# Patient Record
Sex: FEMALE | Race: WHITE | NOT HISPANIC OR LATINO | URBAN - METROPOLITAN AREA
[De-identification: names, ages, dates, MRNs, and addresses within clinical notes are randomized per-mention and may not be internally consistent; named-entity substitution may affect disease eponyms.]

---

## 2017-05-15 ENCOUNTER — OUTPATIENT (OUTPATIENT)
Dept: OUTPATIENT SERVICES | Facility: HOSPITAL | Age: 57
LOS: 1 days | Discharge: HOME | End: 2017-05-15

## 2017-06-28 DIAGNOSIS — Z12.31 ENCOUNTER FOR SCREENING MAMMOGRAM FOR MALIGNANT NEOPLASM OF BREAST: ICD-10-CM

## 2018-01-07 ENCOUNTER — TRANSCRIPTION ENCOUNTER (OUTPATIENT)
Age: 58
End: 2018-01-07

## 2018-03-16 ENCOUNTER — TRANSCRIPTION ENCOUNTER (OUTPATIENT)
Age: 58
End: 2018-03-16

## 2019-03-16 ENCOUNTER — OUTPATIENT (OUTPATIENT)
Dept: OUTPATIENT SERVICES | Facility: HOSPITAL | Age: 59
LOS: 1 days | Discharge: HOME | End: 2019-03-16

## 2019-03-16 DIAGNOSIS — M25.561 PAIN IN RIGHT KNEE: ICD-10-CM

## 2019-03-16 DIAGNOSIS — M25.562 PAIN IN LEFT KNEE: ICD-10-CM

## 2019-04-29 ENCOUNTER — OUTPATIENT (OUTPATIENT)
Dept: OUTPATIENT SERVICES | Facility: HOSPITAL | Age: 59
LOS: 1 days | Discharge: HOME | End: 2019-04-29
Payer: COMMERCIAL

## 2019-04-29 DIAGNOSIS — Z12.31 ENCOUNTER FOR SCREENING MAMMOGRAM FOR MALIGNANT NEOPLASM OF BREAST: ICD-10-CM

## 2019-04-29 PROCEDURE — 77063 BREAST TOMOSYNTHESIS BI: CPT | Mod: 26

## 2019-04-29 PROCEDURE — 77067 SCR MAMMO BI INCL CAD: CPT | Mod: 26

## 2019-12-24 ENCOUNTER — TRANSCRIPTION ENCOUNTER (OUTPATIENT)
Age: 59
End: 2019-12-24

## 2020-10-12 ENCOUNTER — OUTPATIENT (OUTPATIENT)
Dept: OUTPATIENT SERVICES | Facility: HOSPITAL | Age: 60
LOS: 1 days | Discharge: HOME | End: 2020-10-12
Payer: COMMERCIAL

## 2020-10-12 DIAGNOSIS — Z12.31 ENCOUNTER FOR SCREENING MAMMOGRAM FOR MALIGNANT NEOPLASM OF BREAST: ICD-10-CM

## 2020-10-12 PROCEDURE — 77067 SCR MAMMO BI INCL CAD: CPT | Mod: 26

## 2020-10-12 PROCEDURE — 77063 BREAST TOMOSYNTHESIS BI: CPT | Mod: 26

## 2020-11-03 ENCOUNTER — OUTPATIENT (OUTPATIENT)
Dept: OUTPATIENT SERVICES | Facility: HOSPITAL | Age: 60
LOS: 1 days | Discharge: HOME | End: 2020-11-03
Payer: COMMERCIAL

## 2020-11-03 DIAGNOSIS — R92.2 INCONCLUSIVE MAMMOGRAM: ICD-10-CM

## 2020-11-03 PROCEDURE — 76641 ULTRASOUND BREAST COMPLETE: CPT | Mod: 26,50

## 2021-04-12 DIAGNOSIS — Z87.891 PERSONAL HISTORY OF NICOTINE DEPENDENCE: ICD-10-CM

## 2021-04-12 DIAGNOSIS — Z63.5 DISRUPTION OF FAMILY BY SEPARATION AND DIVORCE: ICD-10-CM

## 2021-04-12 DIAGNOSIS — N87.0 MILD CERVICAL DYSPLASIA: ICD-10-CM

## 2021-04-12 DIAGNOSIS — R87.612 LOW GRADE SQUAMOUS INTRAEPITHELIAL LESION ON CYTOLOGIC SMEAR OF CERVIX (LGSIL): ICD-10-CM

## 2021-04-12 DIAGNOSIS — R92.2 INCONCLUSIVE MAMMOGRAM: ICD-10-CM

## 2021-04-12 DIAGNOSIS — Z78.9 OTHER SPECIFIED HEALTH STATUS: ICD-10-CM

## 2021-04-12 PROBLEM — Z00.00 ENCOUNTER FOR PREVENTIVE HEALTH EXAMINATION: Status: ACTIVE | Noted: 2021-04-12

## 2021-04-12 LAB — CYTOLOGY CVX/VAG DOC THIN PREP: NEGATIVE

## 2021-04-12 SDOH — SOCIAL STABILITY - SOCIAL INSECURITY: DISRUPTION OF FAMILY BY SEPARATION AND DIVORCE: Z63.5

## 2021-05-03 ENCOUNTER — APPOINTMENT (OUTPATIENT)
Dept: OBGYN | Facility: CLINIC | Age: 61
End: 2021-05-03
Payer: COMMERCIAL

## 2021-05-03 VITALS
SYSTOLIC BLOOD PRESSURE: 124 MMHG | BODY MASS INDEX: 30.83 KG/M2 | HEIGHT: 63 IN | DIASTOLIC BLOOD PRESSURE: 80 MMHG | WEIGHT: 174 LBS

## 2021-05-03 PROCEDURE — 99213 OFFICE O/P EST LOW 20 MIN: CPT

## 2021-05-03 PROCEDURE — 99072 ADDL SUPL MATRL&STAF TM PHE: CPT

## 2021-05-03 NOTE — HISTORY OF PRESENT ILLNESS
[Patient reported PAP Smear was abnormal] : Patient reported PAP Smear was abnormal [PapSmeardate] : 11/2020 [TextBox_31] : (+) HPV [HPVDate] : 11/2020 [TextBox_78] : (+) [Approximately ___ (Month)] : LMP was approximately [unfilled] month(s) ago [Currently In Menopause] : currently in menopause [FreeTextEntry1] : 2012

## 2021-05-07 LAB — HPV HIGH+LOW RISK DNA PNL CVX: NOT DETECTED

## 2021-05-18 LAB — CYTOLOGY CVX/VAG DOC THIN PREP: NORMAL

## 2021-06-09 ENCOUNTER — OUTPATIENT (OUTPATIENT)
Dept: OUTPATIENT SERVICES | Facility: HOSPITAL | Age: 61
LOS: 1 days | Discharge: HOME | End: 2021-06-09
Payer: COMMERCIAL

## 2021-06-09 DIAGNOSIS — M54.5 LOW BACK PAIN: ICD-10-CM

## 2021-06-09 PROCEDURE — 71100 X-RAY EXAM RIBS UNI 2 VIEWS: CPT | Mod: 26,LT

## 2021-06-09 PROCEDURE — 72072 X-RAY EXAM THORAC SPINE 3VWS: CPT | Mod: 26

## 2021-06-09 PROCEDURE — 71046 X-RAY EXAM CHEST 2 VIEWS: CPT | Mod: 26

## 2021-08-01 ENCOUNTER — TRANSCRIPTION ENCOUNTER (OUTPATIENT)
Age: 61
End: 2021-08-01

## 2021-11-18 ENCOUNTER — TRANSCRIPTION ENCOUNTER (OUTPATIENT)
Age: 61
End: 2021-11-18

## 2021-11-29 ENCOUNTER — LABORATORY RESULT (OUTPATIENT)
Age: 61
End: 2021-11-29

## 2021-11-29 ENCOUNTER — APPOINTMENT (OUTPATIENT)
Dept: OBGYN | Facility: CLINIC | Age: 61
End: 2021-11-29
Payer: COMMERCIAL

## 2021-11-29 VITALS
SYSTOLIC BLOOD PRESSURE: 122 MMHG | HEIGHT: 63 IN | BODY MASS INDEX: 30.12 KG/M2 | WEIGHT: 170 LBS | DIASTOLIC BLOOD PRESSURE: 79 MMHG

## 2021-11-29 DIAGNOSIS — Z01.419 ENCOUNTER FOR GYNECOLOGICAL EXAMINATION (GENERAL) (ROUTINE) W/OUT ABNORMAL FINDINGS: ICD-10-CM

## 2021-11-29 PROCEDURE — 76830 TRANSVAGINAL US NON-OB: CPT

## 2021-11-29 PROCEDURE — 99396 PREV VISIT EST AGE 40-64: CPT | Mod: 25

## 2021-11-29 NOTE — PHYSICAL EXAM
[Chaperone Present] : A chaperone was present in the examining room during all aspects of the physical examination [Examination Of The Breasts] : a normal appearance [] : implants [No Masses] : no breast masses were palpable [Labia Majora] : normal [Labia Minora] : normal [Normal] : normal [Enlarged ___ wks] : enlarged [unfilled] ~Uweeks [Anteversion] : anteverted [Uterine Adnexae] : normal

## 2021-11-29 NOTE — HISTORY OF PRESENT ILLNESS
[postmenopausal] : postmenopausal [N] : Patient does not use contraception [Monogamous (Male Partner)] : is monogamous with a male partner [Y] : Positive pregnancy history [Currently In Menopause] : currently in menopause [Menopause Age: ____] : age at menopause was [unfilled] [Currently Active] : currently active [Men] : men [No] : No [FreeTextEntry1] : annual visit no complaints [LMPDate] : 2012 [PGHxTotal] : 2 [[facility] InstitutexLiving] : 2 [Post-Menopause, No Sxs] : post-menopausal, currently without symptoms

## 2021-11-29 NOTE — PROCEDURE
[Cervical Pap Smear] : cervical Pap smear [Liquid Base] : liquid base [GC & Chlamydia via Pap] : GC & Chlamydia via Pap [Tolerated Well] : the patient tolerated the procedure well [No Complications] : there were no complications [Transvaginal Ultrasound] : transvaginal ultrasound [Anteverted] : anteverted [FreeTextEntry5] : lower uterine segment fibroid 4.58cc vol  no increase in size [FreeTextEntry7] : 1.20ccvol   0.25cc follicule [FreeTextEntry8] : 0.76ccvol    0.18cc follicle

## 2021-12-02 ENCOUNTER — RESULT REVIEW (OUTPATIENT)
Age: 61
End: 2021-12-02

## 2021-12-02 ENCOUNTER — OUTPATIENT (OUTPATIENT)
Dept: OUTPATIENT SERVICES | Facility: HOSPITAL | Age: 61
LOS: 1 days | Discharge: HOME | End: 2021-12-02
Payer: COMMERCIAL

## 2021-12-02 DIAGNOSIS — Z12.31 ENCOUNTER FOR SCREENING MAMMOGRAM FOR MALIGNANT NEOPLASM OF BREAST: ICD-10-CM

## 2021-12-02 DIAGNOSIS — R92.2 INCONCLUSIVE MAMMOGRAM: ICD-10-CM

## 2021-12-02 PROCEDURE — 76641 ULTRASOUND BREAST COMPLETE: CPT | Mod: 26,50

## 2021-12-02 PROCEDURE — 77067 SCR MAMMO BI INCL CAD: CPT | Mod: 26

## 2021-12-02 PROCEDURE — 77063 BREAST TOMOSYNTHESIS BI: CPT | Mod: 26

## 2021-12-07 LAB
C TRACH RRNA SPEC QL NAA+PROBE: NOT DETECTED
N GONORRHOEA RRNA SPEC QL NAA+PROBE: NOT DETECTED
SOURCE AMPLIFICATION: NORMAL

## 2021-12-13 ENCOUNTER — NON-APPOINTMENT (OUTPATIENT)
Age: 61
End: 2021-12-13

## 2021-12-14 LAB
CYTOLOGY CVX/VAG DOC THIN PREP: NORMAL
HPV HIGH+LOW RISK DNA PNL CVX: DETECTED

## 2021-12-15 ENCOUNTER — TRANSCRIPTION ENCOUNTER (OUTPATIENT)
Age: 61
End: 2021-12-15

## 2022-05-23 ENCOUNTER — OUTPATIENT (OUTPATIENT)
Dept: OUTPATIENT SERVICES | Facility: HOSPITAL | Age: 62
LOS: 1 days | Discharge: HOME | End: 2022-05-23
Payer: COMMERCIAL

## 2022-05-23 VITALS
RESPIRATION RATE: 14 BRPM | DIASTOLIC BLOOD PRESSURE: 76 MMHG | HEART RATE: 77 BPM | TEMPERATURE: 99 F | HEIGHT: 63 IN | WEIGHT: 175.05 LBS | OXYGEN SATURATION: 99 % | SYSTOLIC BLOOD PRESSURE: 142 MMHG

## 2022-05-23 DIAGNOSIS — Z01.818 ENCOUNTER FOR OTHER PREPROCEDURAL EXAMINATION: ICD-10-CM

## 2022-05-23 DIAGNOSIS — S83.249D OTHER TEAR OF MEDIAL MENISCUS, CURRENT INJURY, UNSPECIFIED KNEE, SUBSEQUENT ENCOUNTER: ICD-10-CM

## 2022-05-23 DIAGNOSIS — Z98.82 BREAST IMPLANT STATUS: Chronic | ICD-10-CM

## 2022-05-23 DIAGNOSIS — Z98.890 OTHER SPECIFIED POSTPROCEDURAL STATES: Chronic | ICD-10-CM

## 2022-05-23 DIAGNOSIS — K63.1 PERFORATION OF INTESTINE (NONTRAUMATIC): Chronic | ICD-10-CM

## 2022-05-23 LAB
ALBUMIN SERPL ELPH-MCNC: 4.7 G/DL — SIGNIFICANT CHANGE UP (ref 3.5–5.2)
ALP SERPL-CCNC: 79 U/L — SIGNIFICANT CHANGE UP (ref 30–115)
ALT FLD-CCNC: 28 U/L — SIGNIFICANT CHANGE UP (ref 0–41)
ANION GAP SERPL CALC-SCNC: 12 MMOL/L — SIGNIFICANT CHANGE UP (ref 7–14)
APTT BLD: 31.3 SEC — SIGNIFICANT CHANGE UP (ref 27–39.2)
AST SERPL-CCNC: 24 U/L — SIGNIFICANT CHANGE UP (ref 0–41)
BASOPHILS # BLD AUTO: 0.07 K/UL — SIGNIFICANT CHANGE UP (ref 0–0.2)
BASOPHILS NFR BLD AUTO: 0.7 % — SIGNIFICANT CHANGE UP (ref 0–1)
BILIRUB SERPL-MCNC: 0.3 MG/DL — SIGNIFICANT CHANGE UP (ref 0.2–1.2)
BUN SERPL-MCNC: 26 MG/DL — HIGH (ref 10–20)
CALCIUM SERPL-MCNC: 9.7 MG/DL — SIGNIFICANT CHANGE UP (ref 8.5–10.1)
CHLORIDE SERPL-SCNC: 103 MMOL/L — SIGNIFICANT CHANGE UP (ref 98–110)
CO2 SERPL-SCNC: 26 MMOL/L — SIGNIFICANT CHANGE UP (ref 17–32)
CREAT SERPL-MCNC: 0.9 MG/DL — SIGNIFICANT CHANGE UP (ref 0.7–1.5)
EGFR: 72 ML/MIN/1.73M2 — SIGNIFICANT CHANGE UP
EOSINOPHIL # BLD AUTO: 0.09 K/UL — SIGNIFICANT CHANGE UP (ref 0–0.7)
EOSINOPHIL NFR BLD AUTO: 0.9 % — SIGNIFICANT CHANGE UP (ref 0–8)
GLUCOSE SERPL-MCNC: 81 MG/DL — SIGNIFICANT CHANGE UP (ref 70–99)
HCT VFR BLD CALC: 43 % — SIGNIFICANT CHANGE UP (ref 37–47)
HGB BLD-MCNC: 13.8 G/DL — SIGNIFICANT CHANGE UP (ref 12–16)
IMM GRANULOCYTES NFR BLD AUTO: 0.3 % — SIGNIFICANT CHANGE UP (ref 0.1–0.3)
INR BLD: 0.95 RATIO — SIGNIFICANT CHANGE UP (ref 0.65–1.3)
LYMPHOCYTES # BLD AUTO: 2.11 K/UL — SIGNIFICANT CHANGE UP (ref 1.2–3.4)
LYMPHOCYTES # BLD AUTO: 22.1 % — SIGNIFICANT CHANGE UP (ref 20.5–51.1)
MCHC RBC-ENTMCNC: 29.7 PG — SIGNIFICANT CHANGE UP (ref 27–31)
MCHC RBC-ENTMCNC: 32.1 G/DL — SIGNIFICANT CHANGE UP (ref 32–37)
MCV RBC AUTO: 92.5 FL — SIGNIFICANT CHANGE UP (ref 81–99)
MONOCYTES # BLD AUTO: 0.69 K/UL — HIGH (ref 0.1–0.6)
MONOCYTES NFR BLD AUTO: 7.2 % — SIGNIFICANT CHANGE UP (ref 1.7–9.3)
NEUTROPHILS # BLD AUTO: 6.57 K/UL — HIGH (ref 1.4–6.5)
NEUTROPHILS NFR BLD AUTO: 68.8 % — SIGNIFICANT CHANGE UP (ref 42.2–75.2)
NRBC # BLD: 0 /100 WBCS — SIGNIFICANT CHANGE UP (ref 0–0)
PLATELET # BLD AUTO: 313 K/UL — SIGNIFICANT CHANGE UP (ref 130–400)
POTASSIUM SERPL-MCNC: 3.9 MMOL/L — SIGNIFICANT CHANGE UP (ref 3.5–5)
POTASSIUM SERPL-SCNC: 3.9 MMOL/L — SIGNIFICANT CHANGE UP (ref 3.5–5)
PROT SERPL-MCNC: 7.8 G/DL — SIGNIFICANT CHANGE UP (ref 6–8)
PROTHROM AB SERPL-ACNC: 10.9 SEC — SIGNIFICANT CHANGE UP (ref 9.95–12.87)
RBC # BLD: 4.65 M/UL — SIGNIFICANT CHANGE UP (ref 4.2–5.4)
RBC # FLD: 12.8 % — SIGNIFICANT CHANGE UP (ref 11.5–14.5)
SODIUM SERPL-SCNC: 141 MMOL/L — SIGNIFICANT CHANGE UP (ref 135–146)
WBC # BLD: 9.56 K/UL — SIGNIFICANT CHANGE UP (ref 4.8–10.8)
WBC # FLD AUTO: 9.56 K/UL — SIGNIFICANT CHANGE UP (ref 4.8–10.8)

## 2022-05-23 PROCEDURE — 93010 ELECTROCARDIOGRAM REPORT: CPT

## 2022-05-23 NOTE — H&P PST ADULT - DOCUMENT STATUS
Normal rate, regular rhythm.  Heart sounds S1, S2.  No murmurs, rubs or gallops. Authored by Resident/PA/NP

## 2022-05-23 NOTE — H&P PST ADULT - HISTORY OF PRESENT ILLNESS
62y Female presents today for presurgical testing for  RIGHT KNEE ARTHROSCOPY. Patient reports progressively worsening right knee pain initially beginning 2019, and found to be a tear on MRI. She states that with PT, the pain was eliminated until this past 12/2021 after having moved boxes and done extensive walking. Since that time, she has had constant pain, localized to her right knee, 10/10 at times on pain scale, sharp, occasionally throbbing in nature, worsened with walking during which time she notes a cracking sound in the knee. She reports that elevation of the knee is the only thing that minimally alleviates the pain, and that previous cortisone injection was ineffective. She denies any injury and offers no other complaints at this time.   Patient denies any CP, palpitations, SOB, cough, or dysuria. No recent URI or UTI.  Stated exercise tolerance is FOS 2, limited due to knee pain  ZUHAIR screen reviewed    Patient denies any recent personal exposure to COVID19. Denies any sick contacts. Patient denies travel within the past 30 days. Patient was instructed to quarantine until after procedure.    Anesthesia Alert  NO--Difficult Airway  NO--History of neck surgery or radiation  NO--Limited ROM of neck  NO--History of Malignant hyperthermia  NO--Personal or family history of Pseudocholinesterase deficiency.  YES--Prior Anesthesia Complication - PONV  NO--Latex Allergy  NO--Loose teeth  NO--History of Rheumatoid Arthritis  NO--ZUHAIR  NO--Bleeding risk  NO--Other_____    Patient states that this is their complete medical history and list of medications.  Patient verbalized understanding of instructions given during this visit and was given the opportunity to ask questions and have them answered. They were instructed to follow up with their surgeon/surgeon's office with any questions regarding their procedure.

## 2022-05-23 NOTE — H&P PST ADULT - REASON FOR ADMISSION
Case Type: OP Block TimeSuite: CASProceduralist: Dylon Gamez  Confirmed Surgery DateTime: 06- - 0:00PAST DateTime: 05- - 15:45Procedure: RIGHT KNEE ARTHROSCOPY  ERP?: UnavailableLaterality: RightLength of Procedure: 60 Minutes  Anesthesia Type: General

## 2022-05-23 NOTE — H&P PST ADULT - NSICDXPASTSURGICALHX_GEN_ALL_CORE_FT
PAST SURGICAL HISTORY:  Bowel perforation traumatic, during surgical procedure    H/O bilateral breast implants silicone    H/O hernia repair with mesh, abdomen    History of carpal tunnel repair b/l    History of colostomy reversal

## 2022-05-23 NOTE — H&P PST ADULT - NSICDXFAMILYHX_GEN_ALL_CORE_FT
FAMILY HISTORY:  FH: HTN (hypertension)    Father  Still living? No  FH: heart attack, Age at diagnosis: Age Unknown

## 2022-06-10 NOTE — ASU PATIENT PROFILE, ADULT - FALL HARM RISK - UNIVERSAL INTERVENTIONS
Bed in lowest position, wheels locked, appropriate side rails in place/Call bell, personal items and telephone in reach/Instruct patient to call for assistance before getting out of bed or chair/Non-slip footwear when patient is out of bed/[place] to call system/Physically safe environment - no spills, clutter or unnecessary equipment/Purposeful Proactive Rounding/Room/bathroom lighting operational, light cord in reach

## 2022-06-13 ENCOUNTER — OUTPATIENT (OUTPATIENT)
Dept: OUTPATIENT SERVICES | Facility: HOSPITAL | Age: 62
LOS: 1 days | Discharge: HOME | End: 2022-06-13
Payer: COMMERCIAL

## 2022-06-13 ENCOUNTER — RESULT REVIEW (OUTPATIENT)
Age: 62
End: 2022-06-13

## 2022-06-13 ENCOUNTER — TRANSCRIPTION ENCOUNTER (OUTPATIENT)
Age: 62
End: 2022-06-13

## 2022-06-13 VITALS
TEMPERATURE: 99 F | DIASTOLIC BLOOD PRESSURE: 71 MMHG | HEIGHT: 63 IN | HEART RATE: 72 BPM | WEIGHT: 175.05 LBS | RESPIRATION RATE: 20 BRPM | SYSTOLIC BLOOD PRESSURE: 126 MMHG | OXYGEN SATURATION: 100 %

## 2022-06-13 VITALS
HEART RATE: 73 BPM | DIASTOLIC BLOOD PRESSURE: 79 MMHG | RESPIRATION RATE: 14 BRPM | OXYGEN SATURATION: 98 % | SYSTOLIC BLOOD PRESSURE: 125 MMHG

## 2022-06-13 DIAGNOSIS — K63.1 PERFORATION OF INTESTINE (NONTRAUMATIC): Chronic | ICD-10-CM

## 2022-06-13 DIAGNOSIS — S83.249A OTHER TEAR OF MEDIAL MENISCUS, CURRENT INJURY, UNSPECIFIED KNEE, INITIAL ENCOUNTER: ICD-10-CM

## 2022-06-13 DIAGNOSIS — Z98.890 OTHER SPECIFIED POSTPROCEDURAL STATES: Chronic | ICD-10-CM

## 2022-06-13 DIAGNOSIS — Z98.82 BREAST IMPLANT STATUS: Chronic | ICD-10-CM

## 2022-06-13 PROCEDURE — 88304 TISSUE EXAM BY PATHOLOGIST: CPT | Mod: 26

## 2022-06-13 RX ORDER — MORPHINE SULFATE 50 MG/1
4 CAPSULE, EXTENDED RELEASE ORAL
Refills: 0 | Status: DISCONTINUED | OUTPATIENT
Start: 2022-06-13 | End: 2022-06-13

## 2022-06-13 RX ORDER — MEPERIDINE HYDROCHLORIDE 50 MG/ML
12.5 INJECTION INTRAMUSCULAR; INTRAVENOUS; SUBCUTANEOUS ONCE
Refills: 0 | Status: DISCONTINUED | OUTPATIENT
Start: 2022-06-13 | End: 2022-06-13

## 2022-06-13 RX ORDER — OXYCODONE AND ACETAMINOPHEN 5; 325 MG/1; MG/1
1 TABLET ORAL ONCE
Refills: 0 | Status: DISCONTINUED | OUTPATIENT
Start: 2022-06-13 | End: 2022-06-13

## 2022-06-13 RX ORDER — SODIUM CHLORIDE 9 MG/ML
1000 INJECTION, SOLUTION INTRAVENOUS
Refills: 0 | Status: DISCONTINUED | OUTPATIENT
Start: 2022-06-13 | End: 2022-06-27

## 2022-06-13 RX ORDER — ONDANSETRON 8 MG/1
4 TABLET, FILM COATED ORAL ONCE
Refills: 0 | Status: DISCONTINUED | OUTPATIENT
Start: 2022-06-13 | End: 2022-06-27

## 2022-06-13 RX ORDER — MORPHINE SULFATE 50 MG/1
2 CAPSULE, EXTENDED RELEASE ORAL
Refills: 0 | Status: DISCONTINUED | OUTPATIENT
Start: 2022-06-13 | End: 2022-06-13

## 2022-06-13 RX ORDER — OXYCODONE AND ACETAMINOPHEN 5; 325 MG/1; MG/1
1 TABLET ORAL
Qty: 12 | Refills: 0
Start: 2022-06-13

## 2022-06-13 RX ADMIN — OXYCODONE AND ACETAMINOPHEN 1 TABLET(S): 5; 325 TABLET ORAL at 10:49

## 2022-06-13 RX ADMIN — OXYCODONE AND ACETAMINOPHEN 1 TABLET(S): 5; 325 TABLET ORAL at 11:15

## 2022-06-13 RX ADMIN — SODIUM CHLORIDE 100 MILLILITER(S): 9 INJECTION, SOLUTION INTRAVENOUS at 10:31

## 2022-06-13 NOTE — ASU DISCHARGE PLAN (ADULT/PEDIATRIC) - ASU DC SPECIAL INSTRUCTIONSFT
Post Operative Instructions for Knee Surgery    Your Surgery Included  [x ] Menisectomy  [ ] Meniscus Repair					  [ x] Synovectomy/Plica Excision	  [x ] Debridement/Chondroplasty  [ ] Lysis of Adhesions  [ ] ACL reconstruction			  [ ] PCL Reconstruction  [ ] Other:  	  Call our office ([phone]) immediately if you experience any of the following:  •	Excessive bleeding or pus like drainage at the incision site  •	Uncontrollable pain not relieved by pain medication  •	Excessive swelling or redness at the incision site  •	Fever above 101.5 degrees not controlled with Tylenol or Motrin  •	Shortness of Breath  •	Any foul odor or blistering from the surgery site    Pain Management: You were given one or more prescriptions before leaving the hospital. Have the prescriptions filled at a pharmacy on your way home and follow the instructions on the bottles.   Regional Anesthesia Injections (Blocks): You may have been given a regional nerve block either before or after surgery. This may numb your leg for 24-36 hours  	*Proceed with caution when weight bearing on your leg    Diet: Eat a bland diet for the first day after surgery. Progress your diet as tolerated. Constipation may occur with Narcotic usage, contact our office if you are experiencing constipation.    Activity: Limit your activity during the first 48 hours, keep your leg elevated with pillows under your heel. After the first 48 hours at home, increase your activity level based on your symptoms.    Dressing Change: Remove the dressing on the 3rd day. It is normal for some blood to be seen on the dressings. It is also normal for you to see apparent bruising on the skin around your knee when you remove the dressing. If present, leave the steri-strip tape across the incisions. If you are concerned by the drainage or the appearance of your knee, please call one of the numbers listed. Keep covered with Band-Aids/bandages.    Showering: You may shower on the 5th day after surgery if the wound is dry and clean, but do not let the wound soak in water until sutures are removed. Do not submerge in any water until after your postoperative appointment in clinic.    Weight Bearing:						  [ ] Weight bearing as tolerated		  [ ] Nonweight bearing				  [ ] Other:						    Operative Knee Range of Motion  [ ] Full range of motion  [ ] ROM 0-90 deg  [ ] Other:    Knee Exercises: You may do these exercises for 2-5 mins five times a day in order to help regain your range of motion.  [ ] Quad Sets: Begin activating your quadriceps muscle by driving your knee downward into full knee extension while seated on a table or bed   with a towel rolled and propped under your heel  [ ] Straight Leg Raise: While july your quadriceps muscle, lift your fully extended leg to the level of your non-operative knee  [ ] Heel Slides: With the knee straight, slide your heel slowly toward your buttocks, hold at the endpoint for 10-15 seconds, then slowly straighten  [ ] Ankle Pumps: With your knee straight, move your ankle in a "pumping"  fashion to activate your calf and leg muscle     Follow Up: As Scheduled

## 2022-06-13 NOTE — BRIEF OPERATIVE NOTE - NSICDXBRIEFPROCEDURE_GEN_ALL_CORE_FT
PROCEDURES:  Arthroscopic meniscectomy, medial and lateral 13-Jun-2022 10:09:12  Dylon Gamez  Major arthroscopic synovectomy of knee 13-Jun-2022 10:09:16  Dylon Gamez  Chondroplasty, knee 13-Jun-2022 10:09:21  Dylon Gamez

## 2022-06-13 NOTE — ASU DISCHARGE PLAN (ADULT/PEDIATRIC) - CARE PROVIDER_API CALL
Dylon Gamez)  [facility]  [address] [place] [address] [place] [address]  Phone: [phone]  Fax: [phone]  Follow Up Time:

## 2022-06-13 NOTE — CHART NOTE - NSCHARTNOTEFT_GEN_A_CORE
PACU ANESTHESIA ADMISSION NOTE      Procedure: Arthroscopic meniscectomy, medial and lateral    Major arthroscopic synovectomy of knee    Chondroplasty, knee      Post op diagnosis:      __ Intubated  TV:______       Rate: ______      FiO2: ______    __x__  Patent Airway    __x__  Full return of protective reflexes    _x___  Full recovery from anesthesia / back to baseline status    Vitals:  temp(F) 98  /72  spo2 98  RR 16  pulse 87    Mental Status:  _x ___ Awake   _____ Alert   _____ Drowsy   _____ Sedated    Nausea/Vomiting:  ___x _ NO  ______Yes,   See Post - Op Orders          Pain Scale (0-10):  _____    Treatment: ____ None    _x ___ See Post - Op/PCA Orders    Post - Operative Fluids:   ____ Oral   ___x _ See Post - Op Orders    Plan: Discharge:   ___x _Home       _____Floor     _____Critical Care    _____  Other:_________________    Comments: uneventful anesthesia course no complications. Vitals stable. Pt transferred to PACU

## 2022-06-15 LAB — SURGICAL PATHOLOGY STUDY: SIGNIFICANT CHANGE UP

## 2022-06-16 PROBLEM — Z92.89 PERSONAL HISTORY OF OTHER MEDICAL TREATMENT: Chronic | Status: ACTIVE | Noted: 2022-05-23

## 2022-06-16 PROBLEM — J30.2 OTHER SEASONAL ALLERGIC RHINITIS: Chronic | Status: ACTIVE | Noted: 2022-05-23

## 2022-06-22 DIAGNOSIS — M65.9 SYNOVITIS AND TENOSYNOVITIS, UNSPECIFIED: ICD-10-CM

## 2022-06-22 DIAGNOSIS — M23.221 DERANGEMENT OF POSTERIOR HORN OF MEDIAL MENISCUS DUE TO OLD TEAR OR INJURY, RIGHT KNEE: ICD-10-CM

## 2022-06-22 DIAGNOSIS — M23.261 DERANGEMENT OF OTHER LATERAL MENISCUS DUE TO OLD TEAR OR INJURY, RIGHT KNEE: ICD-10-CM

## 2022-06-22 DIAGNOSIS — M23.231 DERANGEMENT OF OTHER MEDIAL MENISCUS DUE TO OLD TEAR OR INJURY, RIGHT KNEE: ICD-10-CM

## 2022-06-22 DIAGNOSIS — M22.41 CHONDROMALACIA PATELLAE, RIGHT KNEE: ICD-10-CM

## 2022-06-23 ENCOUNTER — APPOINTMENT (OUTPATIENT)
Dept: ORTHOPEDIC SURGERY | Facility: CLINIC | Age: 62
End: 2022-06-23
Payer: COMMERCIAL

## 2022-06-23 PROCEDURE — 99024 POSTOP FOLLOW-UP VISIT: CPT

## 2022-06-23 NOTE — IMAGING
[de-identified] :  On evaluation of her right knee she has incisions that are well healed and range of motion 0-120 with mediolateral joint and all patient come improved compared surgery, neurovascular intact, compartments soft nontender

## 2022-06-23 NOTE — ASSESSMENT
[FreeTextEntry1] :  I went over findings with the patient.  I reviewed this surgery in the imaging.  She will start a course of physical therapy and follow up in 6 weeks

## 2022-06-23 NOTE — HISTORY OF PRESENT ILLNESS
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

## 2022-08-12 ENCOUNTER — APPOINTMENT (OUTPATIENT)
Dept: ORTHOPEDIC SURGERY | Facility: CLINIC | Age: 62
End: 2022-08-12

## 2022-08-12 PROCEDURE — 99024 POSTOP FOLLOW-UP VISIT: CPT

## 2022-08-12 NOTE — HISTORY OF PRESENT ILLNESS
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

## 2022-08-31 ENCOUNTER — NON-APPOINTMENT (OUTPATIENT)
Age: 62
End: 2022-08-31

## 2022-10-15 ENCOUNTER — NON-APPOINTMENT (OUTPATIENT)
Age: 62
End: 2022-10-15

## 2022-11-11 ENCOUNTER — APPOINTMENT (OUTPATIENT)
Dept: ORTHOPEDIC SURGERY | Facility: CLINIC | Age: 62
End: 2022-11-11

## 2022-11-11 PROCEDURE — 20610 DRAIN/INJ JOINT/BURSA W/O US: CPT

## 2022-11-11 PROCEDURE — 99213 OFFICE O/P EST LOW 20 MIN: CPT | Mod: 25

## 2022-11-11 NOTE — HISTORY OF PRESENT ILLNESS
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 Anesthesia: ethyl chloride sprayed topically.\par 6cc synvisc 1\par Medication was injected in the right knee. Patient has tried OTC's including aspirin, Ibuprofen, Aleve etc or prescription NSAIDS, and/or exercises at home and/ or physical therapy without satisfactory response. After verbal consent using sterile preparation and technique. The risks, benefits, and alternatives to cortisone injection were explained in full to the patient. Risks outlined include but are not limited to infection, sepsis, bleeding, scarring, skin discoloration, temporary increase in pain, syncopal episode, failure to resolve symptoms, allergic reaction, symptom recurrence, and elevation of blood sugar in diabetics. Patient understood the risks. All questions were answered. After discussion of options, patient requested an injection. Oral informed consent was obtained and sterile prep was done of the injection site. Sterile technique was utilized for the procedure including the preparation of the solutions used for the injection. Patient tolerated the procedure well. Advised to ice the injection site this evening. Prep with alcohol locally to site. Sterile technique used. Diagnostic ultrasound was performed of the knee to confirm.\par

## 2023-01-26 ENCOUNTER — NON-APPOINTMENT (OUTPATIENT)
Age: 63
End: 2023-01-26

## 2023-01-26 DIAGNOSIS — Z12.39 ENCOUNTER FOR OTHER SCREENING FOR MALIGNANT NEOPLASM OF BREAST: ICD-10-CM

## 2023-02-09 ENCOUNTER — APPOINTMENT (OUTPATIENT)
Dept: ORTHOPEDIC SURGERY | Facility: CLINIC | Age: 63
End: 2023-02-09
Payer: COMMERCIAL

## 2023-02-09 PROCEDURE — 99214 OFFICE O/P EST MOD 30 MIN: CPT | Mod: NC

## 2023-02-09 PROCEDURE — 73562 X-RAY EXAM OF KNEE 3: CPT | Mod: 50

## 2023-02-10 ENCOUNTER — RESULT REVIEW (OUTPATIENT)
Age: 63
End: 2023-02-10

## 2023-02-10 ENCOUNTER — OUTPATIENT (OUTPATIENT)
Dept: OUTPATIENT SERVICES | Facility: HOSPITAL | Age: 63
LOS: 1 days | End: 2023-02-10
Payer: COMMERCIAL

## 2023-02-10 DIAGNOSIS — Z98.82 BREAST IMPLANT STATUS: Chronic | ICD-10-CM

## 2023-02-10 DIAGNOSIS — R92.2 INCONCLUSIVE MAMMOGRAM: ICD-10-CM

## 2023-02-10 DIAGNOSIS — K63.1 PERFORATION OF INTESTINE (NONTRAUMATIC): Chronic | ICD-10-CM

## 2023-02-10 DIAGNOSIS — Z12.31 ENCOUNTER FOR SCREENING MAMMOGRAM FOR MALIGNANT NEOPLASM OF BREAST: ICD-10-CM

## 2023-02-10 DIAGNOSIS — Z00.8 ENCOUNTER FOR OTHER GENERAL EXAMINATION: ICD-10-CM

## 2023-02-10 DIAGNOSIS — Z98.890 OTHER SPECIFIED POSTPROCEDURAL STATES: Chronic | ICD-10-CM

## 2023-02-10 PROCEDURE — 77063 BREAST TOMOSYNTHESIS BI: CPT | Mod: 26

## 2023-02-10 PROCEDURE — 77067 SCR MAMMO BI INCL CAD: CPT | Mod: 26

## 2023-02-10 PROCEDURE — 77067 SCR MAMMO BI INCL CAD: CPT

## 2023-02-10 PROCEDURE — 76641 ULTRASOUND BREAST COMPLETE: CPT | Mod: 26,50

## 2023-02-10 PROCEDURE — 77063 BREAST TOMOSYNTHESIS BI: CPT

## 2023-02-10 PROCEDURE — 76641 ULTRASOUND BREAST COMPLETE: CPT | Mod: 50

## 2023-02-13 ENCOUNTER — NON-APPOINTMENT (OUTPATIENT)
Age: 63
End: 2023-02-13

## 2023-02-16 ENCOUNTER — NON-APPOINTMENT (OUTPATIENT)
Age: 63
End: 2023-02-16

## 2023-02-16 ENCOUNTER — APPOINTMENT (OUTPATIENT)
Dept: ORTHOPEDIC SURGERY | Facility: CLINIC | Age: 63
End: 2023-02-16
Payer: COMMERCIAL

## 2023-02-16 PROCEDURE — 20611 DRAIN/INJ JOINT/BURSA W/US: CPT | Mod: RT

## 2023-02-16 PROCEDURE — 99214 OFFICE O/P EST MOD 30 MIN: CPT | Mod: 25

## 2023-02-16 NOTE — HISTORY OF PRESENT ILLNESS
[de-identified] :  Patient is status post right knee medial meniscectomy, lateral meniscectomy, synovectomy and chondroplasty for grade 3 chondromalacia patella and medial femoral condyle\par \par NAD\par Right knee\par No skin breakdown\par Tricompartmental TTP\par Positive patella grind\par PF crepitus\par Negative lachman\par Negative varus/valgus instability\par ROM 0-110\par Pain with forced extension and flexion\par NVI\par Compartments soft and NT\par \par Plan\par went over findings\par will proceed with visco\par tx options discussed\par will proceed with prp, understands may not have beneficial effect and may still have pain, agrees to proceed\par \par under sterile conditions, aspirated 15cc blood from left elbow and seperated 5cc prp from 8aweek centrifuge, injected under sterile conditions into right knee, tolerated well, will refrain from nsaids for 3 weeks

## 2023-02-22 NOTE — HISTORY OF PRESENT ILLNESS
[de-identified] :  Patient is status post right knee medial meniscectomy, lateral meniscectomy, synovectomy and chondroplasty for grade 3 chondromalacia patella and medial femoral condyle\par \par NAD\par Right knee\par No skin breakdown\par Tricompartmental TTP\par Positive patella grind\par PF crepitus\par Negative lachman\par Negative varus/valgus instability\par ROM 0-110\par Pain with forced extension and flexion\par NVI\par Compartments soft and NT\par \par Plan\par went over findings\par explained tx options\par explained injections\par spoke about prp\par explained how beneficial response is not uniform for all, and that pain may return\par will proceed with prp and fu\par

## 2023-03-10 ENCOUNTER — RESULT REVIEW (OUTPATIENT)
Age: 63
End: 2023-03-10

## 2023-03-10 ENCOUNTER — OUTPATIENT (OUTPATIENT)
Dept: OUTPATIENT SERVICES | Facility: HOSPITAL | Age: 63
LOS: 1 days | End: 2023-03-10
Payer: COMMERCIAL

## 2023-03-10 DIAGNOSIS — C50.919 MALIGNANT NEOPLASM OF UNSPECIFIED SITE OF UNSPECIFIED FEMALE BREAST: ICD-10-CM

## 2023-03-10 DIAGNOSIS — Z98.890 OTHER SPECIFIED POSTPROCEDURAL STATES: Chronic | ICD-10-CM

## 2023-03-10 DIAGNOSIS — K63.1 PERFORATION OF INTESTINE (NONTRAUMATIC): Chronic | ICD-10-CM

## 2023-03-10 DIAGNOSIS — Z00.8 ENCOUNTER FOR OTHER GENERAL EXAMINATION: ICD-10-CM

## 2023-03-10 DIAGNOSIS — Z98.82 BREAST IMPLANT STATUS: Chronic | ICD-10-CM

## 2023-03-10 PROCEDURE — 77049 MRI BREAST C-+ W/CAD BI: CPT | Mod: 26

## 2023-03-10 PROCEDURE — 77049 MRI BREAST C-+ W/CAD BI: CPT

## 2023-03-10 PROCEDURE — C8937: CPT

## 2023-03-10 PROCEDURE — A9579: CPT

## 2023-03-11 DIAGNOSIS — C50.919 MALIGNANT NEOPLASM OF UNSPECIFIED SITE OF UNSPECIFIED FEMALE BREAST: ICD-10-CM

## 2023-03-31 NOTE — ASU PREOP CHECKLIST - LATEX ALLERGY
Physical Therapy Evaluation    Visit Type: Initial Evaluation -  Daily Treatment Note  Visit: 1  Referring Provider: Caitlyn Francis,*  Medical Diagnosis (from order): Diagnosis Information    Diagnosis  386.11 (ICD-9-CM) - H81.11 (ICD-10-CM) - BPPV (benign paroxysmal positional vertigo), right       Treatment Diagnosis: vertigo Benign Paroxymal Positional Vertigo (BPPV) Right - impaired activity tolerance, impaired balance, impaired mobility, increased pain/symptoms, impaired gait, increased risk for falls and impaired motor function/performance/coordination.  Chart reviewed at time of initial evaluation (relevant co-morbidities, allergies, tests and medications listed):   Per M.D. note, \"She was doing PT for her back and states this past month her BPPV has gotten worse. The vertigo is lasting for a few seconds. Feels it when bending over and getting much from bed. Meclizine did not help much.\"    Past Medical History:  No date: Abnormal CT scan  No date: Abnormal gait      Comment:  DUE TO RIGHT LEG BEING SHORTER THAN THE LEFT.   No date: Anxiety disorder  No date: Arthropathy of left shoulder      Comment:  Rotator cuff arthropathy of left shoulder.   No date: Cerebral palsy (CMD)  No date: Chronic back pain  No date: Compression fracture of vertebra (CMD)      Comment:  T12  No date: Depressive disorder  No date: Endometriosis  No date: Essential (primary) hypertension  No date: Fibromyalgia  No date: Gastroesophageal reflux disease  No date: High cholesterol  No date: [place] (hard of hearing)      Comment:  hearing aides  No date: Lumbar radiculopathy  No date: Osteoarthritis  No date: Osteoporosis  No date: Rheumatoid arthritis (CMD)  No date: Scoliosis  No date: Weakness of right leg      Comment:  DUE TO CEREBRAL PALSY     Past Surgical History:  2000S: Anes hammertoe or; 1 toe; Bilateral  No date: Anesth,knee arthroscopy; Bilateral      Comment:  TORN MENISCUS REPAIR  1975: Appendectomy  No date:  Bunionectomy; Bilateral  No date: Finger fracture surgery; Right      Comment:  FRACTURE RIGHT INDEX FINGER  1998: Hysterectomy      Comment:  endometriosis  No date: Joint replacement      Comment:  Left knee arthroplasty   1975: Ovarian cyst removal; Right  No date: Revise ulnar nerve at elbow; Left      Comment:  X2  No date: Shoulder arthroscopy; Right      Comment:  ROTATOR CUFF REPAIR X3  No date: Shoulder arthroscopy w/ rotator cuff repair; Left      Comment:  x3  09/15/2021: Shoulder surgery; Left      Comment:  LEFT REVERSE Total Shoulder Arthroplasty     Current Outpatient Medications:  pantoprazole (PROTONIX) 20 MG tablet, Take 1 tablet by mouth daily.  meclizine (ANTIVERT) 25 MG tablet, Take 1 tablet by mouth 3 times daily as needed for Dizziness.  HYDROcodone-acetaminophen (NORCO)  MG per tablet, Take 1 tablet by mouth 2-3 times daily as Needed  pravastatin (PRAVACHOL) 20 MG tablet, Take 1 tablet by mouth daily.  LORazepam (ATIVAN) 0.5 MG tablet,   Xeljanz XR 11 MG TABLET SR 24 HR, Take 11 mg by mouth daily.   DULoxetine (CYMBALTA) 30 MG capsule, Take 30 mg by mouth daily.  topiramate (TOPAMAX) 100 MG tablet, Take 100 mg by mouth every morning.   trazodone (DESYREL) 150 MG tablet, Take 150 mg by mouth nightly.     No current facility-administered medications for this visit.      SUBJECTIVE                                                                                                               The patient is a 62 year old female presenting to physical therapy with diagnosis of BPPV (benign paroxysmal positional vertigo), right (H81.11).     Describes symptom onset as insidious in December. Dizziness and imbalance have been progressively worsening over time. Triggers include: leaning forward to retrieve object from the ground, rolling over onto the right side. She only sleeps on the right side and slightly elevated secondary to lumbar pain and history of left shoulder surgery. Has been  sleeping this way for about 2 years now. Some history of vertigo but not as intense as it is now.  Patient reports: history of vertigo  Auditory Symptoms:   - Left: hearing loss  - Right: hearing loss  Vision  - Patient: wears bifocals  Medication: does not take vertigo medication    Pain / Symptoms  - Quality / Description:      • Dizziness symptoms: floating and disequilibrium       - Duration: episodic and < 30 sec       - Frequency: with head movement and with body movement     - Veering to the right while walking, feels \"thrown.\"  - Alleviating Factors: closing eyes  - Progression since onset: worsening    Function:   Limitations / Exacerbation Factors:   - Patient reports dizziness and imbalance with function reported below.  - , quick head movements and bending forward  Prior Level of Function: declining function, therefore referred to therapy,    Patient Goals: complete ADLs and IADLs without nausea/dizziness, complete all mobility without nausea/dizziness and resolve dizziness/symptoms.    Prior treatment  - no therapies  - Discharged from hospital, home health, or skilled nursing facility in last 30 days: no  Home Environment   - Patient lives with: significant other  - Type of home: apartment  - Assistance available: no assist  - Denies 2 or more falls or an unexplained fall with injury in the last year.  - Feel safe at home / work / school: yes     OBJECTIVE                                                                                                                      Range of Motion (ROM)   (degrees unless noted; active unless noted; norms in ( ); negative=lacking to 0, positive=beyond 0)  Comments: Grossly assessed cervical AROM WNL. Mildly symptomatic but no observable nystagmus with horizontal head turn to the RIGHT.             Ambulation / Gait  - Description: unsteady and path deviation right       Vestibular Testing Without Goggles   VOR = vestibular ocular reflex  Nystagmus  - Spontaneous -  fixation allowed: absent  - Gaze evoked: right gaze - right-beating  Oculomotor/Neurological  - Smooth pursuits: horizontal abnormal; diagonal abnormal; vertical abnormal (Mild right-beating nystagmus with downward and right-sided gaze during smooth pursuits)  - Saccade: horizontal normal; vertical normal; diagonal normal  Head Motion Testing  LARP = left anterior/right posterior, RALP = right anterior/left posterior  - VOR - slow: horizontal - abnormal (Symptomatic with VOR to the RIGHT)  - VOR - head impulse: left negative; right negative  Positional Exam  - Main-Hallpike (anterior and posterior canal)     • Left: negative     • Right: positive       - Nystagmus: up-beating and torsional      Outcome/Assessments  Outcome Measures:   Dizziness Handicap Inventory:   DHI Total Score: 60  (scored 0-100, higher score indicates higher impairment) see flowsheet for additional documentation      Treatment     Therapeutic Exercise  Pt education on exam findings, course of treatment, and rehab expectations.  Pt education on anatomy and mechanics of vestibular system  Pt education on precautions post-canalith repositioning.    Canalith Repositioning  Epley Maneuver completed to address RIGHT benign paroxysmal positional vertigo (BPPV):    - 1 minute hold in each position, performed 1 sets, slow transition between positions with continuous observation for nystagmus or subjective reports of vertigo    - Following treatment, thorough patient education provided allowing for 5 minute rest in upright position   - Patient reports mild fogginess, albeit no significant increase in symptoms, following canalith repositioning technique treatment  Skilled input: verbal instruction/cues and tactile instruction/cues    Writer verbally educated and received verbal consent for hand placement, positioning of patient, and techniques to be performed today from patient for clothing adjustments for techniques, hand placement and palpation for  techniques and therapist position for techniques as described above and how they are pertinent to the patient's plan of care.        ASSESSMENT                                                                                                          62 year old patient has reported functional limitations listed above impacted by signs and symptoms consistent with treatment diagnosis below.  Treatment Diagnosis:   - Involved: vertigo. Benign Paroxymal Positional Vertigo (BPPV) Right  - Symptoms/impairments: impaired activity tolerance, impaired balance, impaired mobility, increased pain/symptoms, impaired gait, increased risk for falls and impaired motor function/performance/coordination.    4/6/23: INITIAL EVALUATION: The patient presents to physical therapy with complaint(s) of \"floating\" sensation and imbalance while walking, with tendency to veer to the right. Patient's symptoms are consistent with referring diagnosis. Clinical findings include oculomotor deficits and positive RIGHT Main-Hallpike testing, suggesting likely RIGHT posterior canalithiasis as well as possible vestibular hypofunction. As a result of these deficits the patient's level of function has also been impaired. The patient will benefit from skilled PT intervention to address noted deficits and to progress toward PLOF. Failure to intervene may lead to permanent function loss and could increase chronicity of this disorder, impeding upon pt's overall function and quality of life.    Prognosis: patient will benefit from skilled therapy  Rehabilitative potential is: excellent.  Predicted patient presentation: Moderate (evolving) - Patient comorbidities and complexities, as defined above, may have varying impact on steady progress for prescribed plan of care.  Education:   - Present and ready to learn: patient  - Results of above outlined education: Verbalizes understanding    PLAN                                                                                                                          The following skilled interventions to be implemented to achieve goals listed below:  Neuromuscular Re-Education (18549)  Therapeutic Activity (50062)  Therapeutic Exercise (62381)  Manual Therapy (32160)  Gait Training (64461)  Activities of Daily Living/Self Care (02880)    Frequency / Duration  1 times per week tapering as patient progresses for 8 weeks for an estimated total of 6 visits    Patient involved in and agreed to plan of care and goals.    Suggestions for next session as indicated: Progress per plan of care.      Goals  Decrease reports of dizziness.  Improved VOR speed/gaze stability.  Negative positional testing.    The above improvements in impairments to assist in obtaining goals listed below  Long Term Goals: to be met by end of plan of care  1. Patient will demonstrate ability to negotiate level and unlevel surfaces at variable velocities, including change of direction without increased pain or instability to return to age appropriate and community activities at prior level of function  2. Patient will demonstrate horizontal VOR equal bilateral to tolerate household tasks and community activities without blurred vision or instability.  3. Dizziness Handicap Inventory: Patient will complete form to reflect an improved score to less than or equal to 53 to indicate patient reported improvement in function/disability/impairment (minimal detectable change: 17 points).  4. Patient will subjectively report a 65% improvement of symptoms of dizziness, nausea, dyequilibrium with head motions and position changes      Therapy procedure time and total treatment time can be found documented on the Time Entry flowsheet   no

## 2023-04-02 ENCOUNTER — LABORATORY RESULT (OUTPATIENT)
Age: 63
End: 2023-04-02

## 2023-04-03 ENCOUNTER — NON-APPOINTMENT (OUTPATIENT)
Age: 63
End: 2023-04-03

## 2023-04-03 ENCOUNTER — APPOINTMENT (OUTPATIENT)
Dept: OBGYN | Facility: CLINIC | Age: 63
End: 2023-04-03
Payer: COMMERCIAL

## 2023-04-03 VITALS
HEART RATE: 84 BPM | HEIGHT: 63 IN | BODY MASS INDEX: 31.01 KG/M2 | SYSTOLIC BLOOD PRESSURE: 133 MMHG | TEMPERATURE: 98 F | WEIGHT: 175 LBS | DIASTOLIC BLOOD PRESSURE: 82 MMHG

## 2023-04-03 DIAGNOSIS — Z01.419 ENCOUNTER FOR GYNECOLOGICAL EXAMINATION (GENERAL) (ROUTINE) W/OUT ABNORMAL FINDINGS: ICD-10-CM

## 2023-04-03 LAB
BILIRUB UR QL STRIP: NEGATIVE
CLARITY UR: CLEAR
COLLECTION METHOD: NORMAL
GLUCOSE UR-MCNC: NEGATIVE
HCG UR QL: 0.2 EU/DL
HGB UR QL STRIP.AUTO: NEGATIVE
KETONES UR-MCNC: NEGATIVE
LEUKOCYTE ESTERASE UR QL STRIP: NEGATIVE
NITRITE UR QL STRIP: NEGATIVE
PH UR STRIP: 6
PROT UR STRIP-MCNC: NEGATIVE
SP GR UR STRIP: >=1.03

## 2023-04-03 PROCEDURE — 99396 PREV VISIT EST AGE 40-64: CPT | Mod: 25

## 2023-04-03 PROCEDURE — 76830 TRANSVAGINAL US NON-OB: CPT

## 2023-04-03 PROCEDURE — 81003 URINALYSIS AUTO W/O SCOPE: CPT | Mod: QW

## 2023-04-03 RX ORDER — ERGOCALCIFEROL 1.25 MG/1
1.25 MG CAPSULE, LIQUID FILLED ORAL
Qty: 4 | Refills: 0 | Status: ACTIVE | COMMUNITY
Start: 2023-03-31

## 2023-04-03 NOTE — HISTORY OF PRESENT ILLNESS
[Menopause Age: ____] : age at menopause was [unfilled] [FreeTextEntry1] : annual visit, c/o pelvic "pressure", worse after ingestion of food\par HPV detected 11/2021 [postmenopausal] : postmenopausal [N] : Patient does not use contraception [Monogamous (Male Partner)] : is monogamous with a male partner [Y] : Positive pregnancy history [LMPDate] : 2012 [PGHxTotal] : 2 [[facility]] : 2 [Currently In Menopause] : currently in menopause [Currently Active] : currently active [Men] : men [No] : No

## 2023-04-03 NOTE — DISCUSSION/SUMMARY
[FreeTextEntry1] : slight increase in size of fibroid, will f/u in 6 months for repeat TV US\par advised pt to have colonoscopy, her symptoms are worse after eating a meal \par \par pt has high level of stress, her mother passed at end of 2022, 1 month later her son overdosed on fentanyl, recovered .

## 2023-04-03 NOTE — PHYSICAL EXAM
[Chaperone Present] : A chaperone was present in the examining room during all aspects of the physical examination [Appropriately responsive] : appropriately responsive [Alert] : alert [No Acute Distress] : no acute distress [Soft] : soft [Non-tender] : non-tender [Non-distended] : non-distended [Oriented x3] : oriented x3 [FreeTextEntry2] : p [FreeTextEntry6] : left breast indurated, capsular contraction? [] : implants [Labia Majora] : normal [Labia Minora] : normal [Normal] : normal [Enlarged ___ wks] : enlarged [unfilled] ~Uweeks [Anteversion] : anteverted [Mass ___ cm] : a [unfilled] ~Ucm uterine mass was palpated [Uterine Adnexae] : normal

## 2023-04-03 NOTE — PROCEDURE
[Cervical Pap Smear] : cervical Pap smear [Liquid Base] : liquid base [GC & Chlamydia via Pap] : GC & Chlamydia via Pap [Tolerated Well] : the patient tolerated the procedure well [No Complications] : there were no complications [Fibroid Uterus] : fibroid uterus [Transvaginal Ultrasound] : transvaginal ultrasound [Anteverted] : anteverted [FreeTextEntry5] : posterior fibroid   1.99 x 2.29 x 2.11cm  5.04cc vol   uterus vol w/o fibroid 33.80cc [FreeTextEntry7] : 0.45cc/ 6mm x 6mm benign cyst [FreeTextEntry8] : 0.62cc benign cyst

## 2023-04-04 ENCOUNTER — NON-APPOINTMENT (OUTPATIENT)
Age: 63
End: 2023-04-04

## 2023-04-05 ENCOUNTER — NON-APPOINTMENT (OUTPATIENT)
Age: 63
End: 2023-04-05

## 2023-04-06 DIAGNOSIS — T85.9XXA UNSPECIFIED COMPLICATION OF INTERNAL PROSTHETIC DEVICE, IMPLANT AND GRAFT, INITIAL ENCOUNTER: ICD-10-CM

## 2023-04-09 LAB
C TRACH RRNA SPEC QL NAA+PROBE: NOT DETECTED
CYTOLOGY CVX/VAG DOC THIN PREP: NORMAL
HPV HIGH+LOW RISK DNA PNL CVX: DETECTED
N GONORRHOEA RRNA SPEC QL NAA+PROBE: NOT DETECTED
SOURCE AMPLIFICATION: NORMAL

## 2023-04-11 ENCOUNTER — NON-APPOINTMENT (OUTPATIENT)
Age: 63
End: 2023-04-11

## 2023-05-11 ENCOUNTER — APPOINTMENT (OUTPATIENT)
Dept: ORTHOPEDIC SURGERY | Facility: CLINIC | Age: 63
End: 2023-05-11
Payer: COMMERCIAL

## 2023-05-11 PROCEDURE — 99213 OFFICE O/P EST LOW 20 MIN: CPT

## 2023-05-15 NOTE — HISTORY OF PRESENT ILLNESS
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

## 2023-06-05 ENCOUNTER — OUTPATIENT (OUTPATIENT)
Dept: OUTPATIENT SERVICES | Facility: HOSPITAL | Age: 63
LOS: 1 days | End: 2023-06-05
Payer: COMMERCIAL

## 2023-06-05 DIAGNOSIS — Z98.890 OTHER SPECIFIED POSTPROCEDURAL STATES: Chronic | ICD-10-CM

## 2023-06-05 DIAGNOSIS — R10.2 PELVIC AND PERINEAL PAIN: ICD-10-CM

## 2023-06-05 DIAGNOSIS — K63.1 PERFORATION OF INTESTINE (NONTRAUMATIC): Chronic | ICD-10-CM

## 2023-06-05 DIAGNOSIS — Z98.82 BREAST IMPLANT STATUS: Chronic | ICD-10-CM

## 2023-06-05 DIAGNOSIS — Z00.8 ENCOUNTER FOR OTHER GENERAL EXAMINATION: ICD-10-CM

## 2023-06-05 DIAGNOSIS — R10.9 UNSPECIFIED ABDOMINAL PAIN: ICD-10-CM

## 2023-06-05 PROCEDURE — 76830 TRANSVAGINAL US NON-OB: CPT

## 2023-06-05 PROCEDURE — 76830 TRANSVAGINAL US NON-OB: CPT | Mod: 26

## 2023-06-05 PROCEDURE — 76700 US EXAM ABDOM COMPLETE: CPT

## 2023-06-05 PROCEDURE — 76700 US EXAM ABDOM COMPLETE: CPT | Mod: 26

## 2023-06-06 DIAGNOSIS — R10.2 PELVIC AND PERINEAL PAIN: ICD-10-CM

## 2023-06-06 DIAGNOSIS — R10.9 UNSPECIFIED ABDOMINAL PAIN: ICD-10-CM

## 2023-06-09 ENCOUNTER — APPOINTMENT (OUTPATIENT)
Dept: ORTHOPEDIC SURGERY | Facility: CLINIC | Age: 63
End: 2023-06-09
Payer: COMMERCIAL

## 2023-06-09 DIAGNOSIS — S83.241A OTHER TEAR OF MEDIAL MENISCUS, CURRENT INJURY, RIGHT KNEE, INITIAL ENCOUNTER: ICD-10-CM

## 2023-06-09 PROCEDURE — 20611 DRAIN/INJ JOINT/BURSA W/US: CPT | Mod: RT

## 2023-06-09 PROCEDURE — 99213 OFFICE O/P EST LOW 20 MIN: CPT | Mod: 25

## 2023-06-11 PROBLEM — S83.241A TEAR OF MEDIAL MENISCUS OF RIGHT KNEE: Status: ACTIVE | Noted: 2022-06-23

## 2023-06-11 NOTE — HISTORY OF PRESENT ILLNESS
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 Anesthesia: ethyl chloride sprayed topically.\par 4cc monovisc\par Medication was injected in the right knee. Patient has tried OTC's including aspirin, Ibuprofen, Aleve etc or prescription NSAIDS, and/or exercises at home and/ or physical therapy without satisfactory response. After verbal consent using sterile preparation and technique. The risks, benefits, and alternatives to cortisone injection were explained in full to the patient. Risks outlined include but are not limited to infection, sepsis, bleeding, scarring, skin discoloration, temporary increase in pain, syncopal episode, failure to resolve symptoms, allergic reaction, symptom recurrence, and elevation of blood sugar in diabetics. Patient understood the risks. All questions were answered. After discussion of options, patient requested an injection. Oral informed consent was obtained and sterile prep was done of the injection site. Sterile technique was utilized for the procedure including the preparation of the solutions used for the injection. Patient tolerated the procedure well. Advised to ice the injection site this evening. Prep with alcohol locally to site. Sterile technique used. Diagnostic ultrasound was performed of the knee to confirm.\par

## 2023-06-22 ENCOUNTER — APPOINTMENT (OUTPATIENT)
Dept: ORTHOPEDIC SURGERY | Facility: CLINIC | Age: 63
End: 2023-06-22

## 2023-08-15 ENCOUNTER — NON-APPOINTMENT (OUTPATIENT)
Age: 63
End: 2023-08-15

## 2023-08-20 ENCOUNTER — NON-APPOINTMENT (OUTPATIENT)
Age: 63
End: 2023-08-20

## 2023-10-09 ENCOUNTER — LABORATORY RESULT (OUTPATIENT)
Age: 63
End: 2023-10-09

## 2023-10-09 ENCOUNTER — APPOINTMENT (OUTPATIENT)
Dept: OBGYN | Facility: CLINIC | Age: 63
End: 2023-10-09
Payer: COMMERCIAL

## 2023-10-09 VITALS
WEIGHT: 175 LBS | HEIGHT: 63 IN | HEART RATE: 80 BPM | DIASTOLIC BLOOD PRESSURE: 85 MMHG | BODY MASS INDEX: 31.01 KG/M2 | SYSTOLIC BLOOD PRESSURE: 136 MMHG

## 2023-10-09 DIAGNOSIS — R87.810 CERVICAL HIGH RISK HUMAN PAPILLOMAVIRUS (HPV) DNA TEST POSITIVE: ICD-10-CM

## 2023-10-09 DIAGNOSIS — R32 UNSPECIFIED URINARY INCONTINENCE: ICD-10-CM

## 2023-10-09 PROCEDURE — 99214 OFFICE O/P EST MOD 30 MIN: CPT | Mod: 25

## 2023-10-09 PROCEDURE — 76830 TRANSVAGINAL US NON-OB: CPT

## 2023-10-15 LAB
CYTOLOGY CVX/VAG DOC THIN PREP: NORMAL
HPV HIGH+LOW RISK DNA PNL CVX: DETECTED

## 2023-10-16 ENCOUNTER — NON-APPOINTMENT (OUTPATIENT)
Age: 63
End: 2023-10-16

## 2023-11-09 ENCOUNTER — APPOINTMENT (OUTPATIENT)
Dept: BREAST CENTER | Facility: CLINIC | Age: 63
End: 2023-11-09
Payer: COMMERCIAL

## 2023-11-09 VITALS
WEIGHT: 176 LBS | SYSTOLIC BLOOD PRESSURE: 136 MMHG | DIASTOLIC BLOOD PRESSURE: 80 MMHG | HEART RATE: 79 BPM | BODY MASS INDEX: 31.18 KG/M2 | HEIGHT: 63 IN

## 2023-11-09 DIAGNOSIS — R92.2 INCONCLUSIVE MAMMOGRAM: ICD-10-CM

## 2023-11-09 DIAGNOSIS — N64.4 MASTODYNIA: ICD-10-CM

## 2023-11-09 DIAGNOSIS — R92.30 INCONCLUSIVE MAMMOGRAM: ICD-10-CM

## 2023-11-09 PROCEDURE — 99204 OFFICE O/P NEW MOD 45 MIN: CPT

## 2023-11-10 ENCOUNTER — APPOINTMENT (OUTPATIENT)
Dept: ORTHOPEDIC SURGERY | Facility: CLINIC | Age: 63
End: 2023-11-10

## 2024-01-11 ENCOUNTER — OUTPATIENT (OUTPATIENT)
Dept: OUTPATIENT SERVICES | Facility: HOSPITAL | Age: 64
LOS: 1 days | End: 2024-01-11
Payer: COMMERCIAL

## 2024-01-11 DIAGNOSIS — Z98.890 OTHER SPECIFIED POSTPROCEDURAL STATES: Chronic | ICD-10-CM

## 2024-01-11 DIAGNOSIS — Z00.8 ENCOUNTER FOR OTHER GENERAL EXAMINATION: ICD-10-CM

## 2024-01-11 DIAGNOSIS — K63.1 PERFORATION OF INTESTINE (NONTRAUMATIC): Chronic | ICD-10-CM

## 2024-01-11 DIAGNOSIS — Z98.82 BREAST IMPLANT STATUS: Chronic | ICD-10-CM

## 2024-01-11 DIAGNOSIS — R07.9 CHEST PAIN, UNSPECIFIED: ICD-10-CM

## 2024-01-11 PROCEDURE — 75574 CT ANGIO HRT W/3D IMAGE: CPT

## 2024-01-11 PROCEDURE — 75574 CT ANGIO HRT W/3D IMAGE: CPT | Mod: 26

## 2024-01-12 DIAGNOSIS — R07.9 CHEST PAIN, UNSPECIFIED: ICD-10-CM

## 2024-01-26 ENCOUNTER — APPOINTMENT (OUTPATIENT)
Dept: PLASTIC SURGERY | Facility: CLINIC | Age: 64
End: 2024-01-26
Payer: COMMERCIAL

## 2024-01-26 VITALS — HEIGHT: 63 IN | BODY MASS INDEX: 31.01 KG/M2 | WEIGHT: 175 LBS

## 2024-01-26 DIAGNOSIS — T85.44XA CAPSULAR CONTRACTURE OF BREAST IMPLANT, INITIAL ENCOUNTER: ICD-10-CM

## 2024-01-26 DIAGNOSIS — Z98.82 BREAST IMPLANT STATUS: ICD-10-CM

## 2024-01-26 DIAGNOSIS — N64.4 MASTODYNIA: ICD-10-CM

## 2024-01-26 PROCEDURE — 99203 OFFICE O/P NEW LOW 30 MIN: CPT

## 2024-01-26 NOTE — HISTORY OF PRESENT ILLNESS
[FreeTextEntry1] : 64 y/o female with h/o HLD, who presents for evaluation of bilateral pre-pectoral breast implants. Pt had silicone breast implants placed via IMF incision by Dr. Antoine Villafana in 2009 (Pt does not have implant card).  Pt denies any issues post-op, and was initially happy with her chest size (wearing a 38C) however would ideally like to be smaller. Pt states that she started noticing left breast pain approx 1 years ago and notes that the implant is harder on the left side compared to the right. Pt had a mammo & US completed Feb 2023 (both of which were neg for malignancy, Birads 1). Pt also had a b/l breast MRI march 2023 that was WNL, no mention of CC.   Current bra cup size: 38D cup  No personal or family h/o breast cancer No h/o DM Non smoker  Occ:

## 2024-01-26 NOTE — ASSESSMENT
[FreeTextEntry1] : 64 y/o female who presents with c/o left breast pain and firmness x 1 year, s/p bilateralbreast silicone implant augmentation (2009) now with bilateral symptomatic capsular contracture (right grade 3  and left grade 4) and breast ptosis  -Recommend pre-op MRI for implant rupture rule out -Pt expresses interest in implant downsizing. Reviewed with patient recommendation for concurrent mastopexy with implant downsizing. -FDA checklist given for review -Recommend pt bring implant card info and operative report from prior PRS surgery -all questions were answered -Follow up after MRI for surgical planning and FDA checklist review, pre-op measurements and photos

## 2024-01-26 NOTE — DATA REVIEWED
[FreeTextEntry1] : ACC: [id number] EXAM: MR BREAST WAWIC BI W CAD# ORDERED BY: Aylin Coronel  PROCEDURE DATE: 03/10/2023  INTERPRETATION: Clinical History / Reason for exam: Screening MRI.  Technique: Breast MRI is performed at 1.5 T with the patient prone and the breasts in a dedicated breast coil. Following a 3 plane localizer, sagittal T1 weighted, fat-saturated T1 weighted and fat saturated T2-weighted sequence; dynamic contrast enhanced sagittal images; and delayed post-contrast axial fat-saturated T1 weighted images were obtained. 8 mL gadolinium contrast was injected and 2 mL was discarded. Subtraction and MIP images were reviewed. Ember software was used in interpretation.  Comparison: No prior MRIs available for comparison. Most recent mammogram and ultrasound dated 2/10/2023.  Findings:  Amount of fibroglandular tissue: Heterogeneous fibroglandular tissue  Background parenchymal enhancement: Minimal, Symmetric  There are bilateral prepectoral implants. However, dedicated silicone sequences were not obtained for the evaluation of implant integrity.  RIGHT BREAST: No suspicious enhancement in the right breast.  The nipple and skin appear normal. There is no axillary adenopathy.  LEFT BREAST: No suspicious enhancement in the left breast.  The nipple and skin appear normal. There is no axillary adenopathy.  The imaged portions of the chest and abdomen are unremarkable.  Impression:  No suspicious enhancement in either breast.  The patient will be recalled for dedicated silicone sequences for evaluation of bilateral implant integrity.  Recommendation: Unless otherwise indicated by clinical findings, annual screening mammography recommended.  BI-RADS Category 1: Negative  --- End of Report ---      MONISHA CODY DO; Attending Radiologist This document has been electronically signed. Mar 13 2023 6:49PM

## 2024-02-12 ENCOUNTER — RESULT REVIEW (OUTPATIENT)
Age: 64
End: 2024-02-12

## 2024-02-12 ENCOUNTER — OUTPATIENT (OUTPATIENT)
Dept: OUTPATIENT SERVICES | Facility: HOSPITAL | Age: 64
LOS: 1 days | End: 2024-02-12
Payer: COMMERCIAL

## 2024-02-12 DIAGNOSIS — Z98.890 OTHER SPECIFIED POSTPROCEDURAL STATES: Chronic | ICD-10-CM

## 2024-02-12 DIAGNOSIS — K63.1 PERFORATION OF INTESTINE (NONTRAUMATIC): Chronic | ICD-10-CM

## 2024-02-12 DIAGNOSIS — Z98.82 BREAST IMPLANT STATUS: Chronic | ICD-10-CM

## 2024-02-12 DIAGNOSIS — N64.4 MASTODYNIA: ICD-10-CM

## 2024-02-12 DIAGNOSIS — T85.44XA CAPSULAR CONTRACTURE OF BREAST IMPLANT, INITIAL ENCOUNTER: ICD-10-CM

## 2024-02-12 PROCEDURE — 77047 MRI BREAST C- BILATERAL: CPT | Mod: 26

## 2024-02-12 PROCEDURE — 77047 MRI BREAST C- BILATERAL: CPT

## 2024-02-13 DIAGNOSIS — N64.4 MASTODYNIA: ICD-10-CM

## 2024-02-22 ENCOUNTER — APPOINTMENT (OUTPATIENT)
Dept: BREAST CENTER | Facility: CLINIC | Age: 64
End: 2024-02-22

## 2024-03-01 ENCOUNTER — APPOINTMENT (OUTPATIENT)
Dept: PLASTIC SURGERY | Facility: CLINIC | Age: 64
End: 2024-03-01
Payer: COMMERCIAL

## 2024-03-01 PROCEDURE — 99213 OFFICE O/P EST LOW 20 MIN: CPT

## 2024-03-02 NOTE — PHYSICAL EXAM
[Bra Size: _______] : Bra Size: [unfilled] [de-identified] : ATNC [de-identified] : WDDIDI  [de-identified] : supple [de-identified] : Non labored on RA [de-identified] : RICOs [de-identified] : Bilateral breasts with good volume symmetry with no overlying skin changes, noticeable rippling or nipple changes/retraction/discharge.  B/l nipple sensation intact.  well-healed IMF scars Left breast with well healed taty-areolar incision at 12 oclock after cyst excision. Implant mobile with firmness and tenderness throughout, most prominent on superior and medial aspect. grade 4 CC  Right breast soft without any tenderness, grade 3 CC, mild waterfall deformity and overall breast ptosis, well-healled inferior periareolar scar  Left breast: no palpable masses, nipple retraction or discharge. Right breast: no palpable masses, nipple retraction or discharge. Axilla: no clinically palpable lymph adenopathy bilateral Chest: no trunk deformities  Breast measurements (standing; (cm)): Regn[place] ptosis stgstrstastdstest:st st1st R SN-Nipple: 27 cm R Nipple-IMF: 9.5 cm R Nipple - midsternum: 7.5 cm R NAC diameter: 9.5 cm  IMF position: R breast 1cm lower than L breast  L SN-Nipple: 25.5 cm L Nipple-IMF: 11 cm L Nipple - midsternum: 7.5 cm L NAC diameter: 7 cm

## 2024-03-02 NOTE — HISTORY OF PRESENT ILLNESS
[FreeTextEntry1] : 64 y/o female with h/o HLD, who presents for evaluation of bilateral pre-pectoral breast implants. Pt had silicone breast implants placed via IMF incision by Dr. Antoine Villafana in 2009 (Pt does not have implant card).  Pt denies any issues post-op, and was initially happy with her chest size (wearing a 38C) however would ideally like to be smaller. Pt states that she started noticing left breast pain approx 1 years ago and notes that the implant is harder on the left side compared to the right. Pt had a mammo & US completed Feb 2023 (both of which were neg for malignancy, Birads 1). Pt also had a b/l breast MRI march 2023 that was WNL, no mention of CC.   Here to discuss treatment options  Current bra cup size: 38D cup Desired volume outcome and shape: "smaller"  No personal or family h/o breast cancer No h/o DM Non smoker  Occ:    Interval hx (3/1/24): Pt Here today to discuss MRI results.  She has implant card info - REF  BL.  MRI demonstrates b/l intracapsular implant rupture. Patient would like to remain the same bra size so would like a similarly sized implant.  No new health issues.

## 2024-03-02 NOTE — ASSESSMENT
[FreeTextEntry1] : 64 y/o female who presents with c/o left breast pain and firmness x 1 year, s/p bilateral breast silicone implant augmentation (2009)   Now with bilateral symptomatic capsular contracture (right grade 3 and left grade 4) and breast ptosis (grade 2)  Recommend bilateral ruptured implant removal, capsulectomy and concurrent mastopexy (vertical or Wise pattern scar)  -MRI reviewed, bilateral intracapsular rupture -Implant card reviewed, 57-201ic Allergan bilaterally -Pt expresses interest in implant exchange. Reviewed with patient recommendation for concurrent mastopexy with implant exchange. -I discussed her treatment options of bilateral implant removal and replacement implant and concurrent mastopexy vs bilateral implant removal and mastopexy.  -I had a detailed discussion regarding the procedure and reviewed the benefits, risks, and outcomes.  -The risks include but are not limited to bleeding, infection, seroma, hematoma, wound separation or poor wound healing, tissue ischemia/necrosis, ongoing NAC numbness, scarring in particular hypertrophic or keloid scarring, breast asymmetry, need for additional surgery, loss of NAC sensation and dissatisfaction with outcome. -Reviewed risk of keloids scarring. -I reviewed with the patient the location of incisional scars, possible use of surgical drain, need to wear surgical support bra post-operatively, and no post-operative heavy lifting. -The patient understands the procedure and the risks, and she elects to proceed with bilateral implant removal, capsulectomy and mastopexy. -[facility] patient checklist reviewed with patient in detail.  All questions were answered.  Patient initials and signatures obtained as indicated and countersigned by MD.  Checklist copy provided to patient is on file. -All questions were answered -Will schedule outpatient MARGOTH procedure under [place]  Photos were taken with patient permission.

## 2024-03-02 NOTE — DATA REVIEWED
[FreeTextEntry1] : ACC: [id number] EXAM: MR BREAST WAWIC BI W CAD# ORDERED BY: Aylin Coronel  PROCEDURE DATE: 03/10/2023  INTERPRETATION: Clinical History / Reason for exam: Screening MRI.  Technique: Breast MRI is performed at 1.5 T with the patient prone and the breasts in a dedicated breast coil. Following a 3 plane localizer, sagittal T1 weighted, fat-saturated T1 weighted and fat saturated T2-weighted sequence; dynamic contrast enhanced sagittal images; and delayed post-contrast axial fat-saturated T1 weighted images were obtained. 8 mL gadolinium contrast was injected and 2 mL was discarded. Subtraction and MIP images were reviewed. Storrz software was used in interpretation.  Comparison: No prior MRIs available for comparison. Most recent mammogram and ultrasound dated 2/10/2023.  Findings:  Amount of fibroglandular tissue: Heterogeneous fibroglandular tissue  Background parenchymal enhancement: Minimal, Symmetric  There are bilateral prepectoral implants. However, dedicated silicone sequences were not obtained for the evaluation of implant integrity.  RIGHT BREAST: No suspicious enhancement in the right breast.  The nipple and skin appear normal. There is no axillary adenopathy.  LEFT BREAST: No suspicious enhancement in the left breast.  The nipple and skin appear normal. There is no axillary adenopathy.  The imaged portions of the chest and abdomen are unremarkable.  Impression:  No suspicious enhancement in either breast.  The patient will be recalled for dedicated silicone sequences for evaluation of bilateral implant integrity.  Recommendation: Unless otherwise indicated by clinical findings, annual screening mammography recommended.  BI-RADS Category 1: Negative  --- End of Report ---      MONISHA CODY DO; Attending Radiologist This document has been electronically signed. Mar 13 2023 6:49PM

## 2024-03-04 ENCOUNTER — NON-APPOINTMENT (OUTPATIENT)
Age: 64
End: 2024-03-04

## 2024-04-18 ENCOUNTER — APPOINTMENT (OUTPATIENT)
Dept: OBGYN | Facility: CLINIC | Age: 64
End: 2024-04-18
Payer: COMMERCIAL

## 2024-04-18 VITALS
DIASTOLIC BLOOD PRESSURE: 77 MMHG | HEART RATE: 71 BPM | BODY MASS INDEX: 28.7 KG/M2 | SYSTOLIC BLOOD PRESSURE: 113 MMHG | HEIGHT: 63 IN | WEIGHT: 162 LBS

## 2024-04-18 DIAGNOSIS — N85.2 HYPERTROPHY OF UTERUS: ICD-10-CM

## 2024-04-18 DIAGNOSIS — Z01.411 ENCOUNTER FOR GYNECOLOGICAL EXAMINATION (GENERAL) (ROUTINE) WITH ABNORMAL FINDINGS: ICD-10-CM

## 2024-04-18 DIAGNOSIS — D25.9 LEIOMYOMA OF UTERUS, UNSPECIFIED: ICD-10-CM

## 2024-04-18 LAB
BILIRUB UR QL STRIP: NORMAL
CLARITY UR: CLEAR
COLLECTION METHOD: NORMAL
GLUCOSE UR-MCNC: NORMAL
HCG UR QL: 0.2 EU/DL
HGB UR QL STRIP.AUTO: NORMAL
KETONES UR-MCNC: NORMAL
LEUKOCYTE ESTERASE UR QL STRIP: NORMAL
NITRITE UR QL STRIP: NORMAL
PH UR STRIP: 5.5
PROT UR STRIP-MCNC: NORMAL
SP GR UR STRIP: 1.02

## 2024-04-18 PROCEDURE — 81003 URINALYSIS AUTO W/O SCOPE: CPT | Mod: QW

## 2024-04-18 PROCEDURE — 76830 TRANSVAGINAL US NON-OB: CPT

## 2024-04-18 PROCEDURE — 99396 PREV VISIT EST AGE 40-64: CPT | Mod: 25

## 2024-04-18 RX ORDER — ROSUVASTATIN CALCIUM 5 MG/1
TABLET, FILM COATED ORAL
Refills: 0 | Status: ACTIVE | COMMUNITY

## 2024-04-18 RX ORDER — MIRABEGRON 25 MG/1
25 TABLET, FILM COATED, EXTENDED RELEASE ORAL
Qty: 60 | Refills: 1 | Status: COMPLETED | COMMUNITY
Start: 2023-10-09 | End: 2024-04-18

## 2024-04-18 NOTE — DISCUSSION/SUMMARY
[FreeTextEntry1] : pt questioned about removing fibroid, has some lower pressure in pelvis, no pain pt had previous colon surgery for perforated colon, temporary colostomy, reversed. Perforation occurred during breast aug. sx. 2007 had ventral hernia surgery with large mesh placement following colon sx would not recommend sx in this pt for a stable fibroid unless her symptoms worsen.

## 2024-04-18 NOTE — PHYSICAL EXAM
[Chaperone Present] : A chaperone was present in the examining room during all aspects of the physical examination [34096] : A chaperone was present during the pelvic exam. [Appropriately responsive] : appropriately responsive [Alert] : alert [No Acute Distress] : no acute distress [Soft] : soft [Non-tender] : non-tender [Non-distended] : non-distended [Oriented x3] : oriented x3 [FreeTextEntry6] : declined today [Labia Majora] : normal [Labia Minora] : normal [No Bleeding] : There was no active vaginal bleeding [Normal] : normal [Enlarged ___ wks] : enlarged [unfilled] ~Uweeks [Anteversion] : anteverted [Mass ___ cm] : a [unfilled] ~Ucm uterine mass was palpated [Uterine Adnexae] : normal

## 2024-04-18 NOTE — PROCEDURE
[Cervical Pap Smear] : cervical Pap smear [Liquid Base] : liquid base [GC & Chlamydia via Pap] : GC & Chlamydia via Pap [Tolerated Well] : the patient tolerated the procedure well [No Complications] : there were no complications [L: ___ cm] : L: [unfilled] cm [W: ___cm] : W: [unfilled] cm [H: ___ cm] : H: [unfilled] cm [FreeTextEntry5] : 4.68cc posterior fibroid, lower segment  no increase in size  EMS thin    above measurements do not include fibroid [FreeTextEntry7] : 1.00cc   5x6mm cyst  simple [FreeTextEntry8] : 1.25cc   0.96cc simple appearing cyst

## 2024-04-18 NOTE — HISTORY OF PRESENT ILLNESS
[FreeTextEntry1] : annual visit hx of fibroid has bilateral ruptured implants, having sx 5/9 (Dr Pan)  [postmenopausal] : postmenopausal [N] : Patient does not use contraception [Monogamous (Male Partner)] : is monogamous with a male partner [Y] : Positive pregnancy history [LMPDate] : 2012 [PGHxTotal] : 2 [[facility] : 2 [Currently In Menopause] : currently in menopause [Post-Menopause, No Sxs] : post-menopausal, currently without symptoms [Menopause Age: ____] : age at menopause was [unfilled] [Currently Active] : currently active [Men] : men [No] : No

## 2024-04-23 LAB
C TRACH RRNA SPEC QL NAA+PROBE: NOT DETECTED
HPV HIGH+LOW RISK DNA PNL CVX: NOT DETECTED
N GONORRHOEA RRNA SPEC QL NAA+PROBE: NOT DETECTED
SOURCE TP AMPLIFICATION: NORMAL

## 2024-04-26 ENCOUNTER — OUTPATIENT (OUTPATIENT)
Dept: OUTPATIENT SERVICES | Facility: HOSPITAL | Age: 64
LOS: 1 days | End: 2024-04-26
Payer: COMMERCIAL

## 2024-04-26 VITALS
RESPIRATION RATE: 18 BRPM | SYSTOLIC BLOOD PRESSURE: 123 MMHG | TEMPERATURE: 98 F | OXYGEN SATURATION: 97 % | WEIGHT: 162.92 LBS | HEIGHT: 63 IN | DIASTOLIC BLOOD PRESSURE: 71 MMHG | HEART RATE: 91 BPM

## 2024-04-26 DIAGNOSIS — Z98.890 OTHER SPECIFIED POSTPROCEDURAL STATES: Chronic | ICD-10-CM

## 2024-04-26 DIAGNOSIS — T85.44XD CAPSULAR CONTRACTURE OF BREAST IMPLANT, SUBSEQUENT ENCOUNTER: ICD-10-CM

## 2024-04-26 DIAGNOSIS — K63.1 PERFORATION OF INTESTINE (NONTRAUMATIC): Chronic | ICD-10-CM

## 2024-04-26 DIAGNOSIS — Z01.818 ENCOUNTER FOR OTHER PREPROCEDURAL EXAMINATION: ICD-10-CM

## 2024-04-26 DIAGNOSIS — Z98.82 BREAST IMPLANT STATUS: Chronic | ICD-10-CM

## 2024-04-26 LAB
ALBUMIN SERPL ELPH-MCNC: 4.4 G/DL — SIGNIFICANT CHANGE UP (ref 3.5–5.2)
ALP SERPL-CCNC: 74 U/L — SIGNIFICANT CHANGE UP (ref 30–115)
ALT FLD-CCNC: 37 U/L — SIGNIFICANT CHANGE UP (ref 0–41)
ANION GAP SERPL CALC-SCNC: 17 MMOL/L — HIGH (ref 7–14)
APPEARANCE UR: ABNORMAL
AST SERPL-CCNC: 28 U/L — SIGNIFICANT CHANGE UP (ref 0–41)
BACTERIA # UR AUTO: ABNORMAL /HPF
BASOPHILS # BLD AUTO: 0.07 K/UL — SIGNIFICANT CHANGE UP (ref 0–0.2)
BASOPHILS NFR BLD AUTO: 1.1 % — HIGH (ref 0–1)
BILIRUB SERPL-MCNC: 0.3 MG/DL — SIGNIFICANT CHANGE UP (ref 0.2–1.2)
BILIRUB UR-MCNC: NEGATIVE — SIGNIFICANT CHANGE UP
BUN SERPL-MCNC: 23 MG/DL — HIGH (ref 10–20)
CALCIUM SERPL-MCNC: 9.5 MG/DL — SIGNIFICANT CHANGE UP (ref 8.4–10.5)
CAST: 0 /LPF — SIGNIFICANT CHANGE UP (ref 0–4)
CHLORIDE SERPL-SCNC: 103 MMOL/L — SIGNIFICANT CHANGE UP (ref 98–110)
CO2 SERPL-SCNC: 24 MMOL/L — SIGNIFICANT CHANGE UP (ref 17–32)
COLOR SPEC: YELLOW — SIGNIFICANT CHANGE UP
CREAT SERPL-MCNC: 0.8 MG/DL — SIGNIFICANT CHANGE UP (ref 0.7–1.5)
CYTOLOGY CVX/VAG DOC THIN PREP: NORMAL
DIFF PNL FLD: NEGATIVE — SIGNIFICANT CHANGE UP
EGFR: 82 ML/MIN/1.73M2 — SIGNIFICANT CHANGE UP
EOSINOPHIL # BLD AUTO: 0.11 K/UL — SIGNIFICANT CHANGE UP (ref 0–0.7)
EOSINOPHIL NFR BLD AUTO: 1.7 % — SIGNIFICANT CHANGE UP (ref 0–8)
GLUCOSE SERPL-MCNC: 66 MG/DL — LOW (ref 70–99)
GLUCOSE UR QL: NEGATIVE MG/DL — SIGNIFICANT CHANGE UP
HCT VFR BLD CALC: 43.8 % — SIGNIFICANT CHANGE UP (ref 37–47)
HGB BLD-MCNC: 14.1 G/DL — SIGNIFICANT CHANGE UP (ref 12–16)
IMM GRANULOCYTES NFR BLD AUTO: 0.2 % — SIGNIFICANT CHANGE UP (ref 0.1–0.3)
KETONES UR-MCNC: NEGATIVE MG/DL — SIGNIFICANT CHANGE UP
LEUKOCYTE ESTERASE UR-ACNC: NEGATIVE — SIGNIFICANT CHANGE UP
LYMPHOCYTES # BLD AUTO: 1.83 K/UL — SIGNIFICANT CHANGE UP (ref 1.2–3.4)
LYMPHOCYTES # BLD AUTO: 27.5 % — SIGNIFICANT CHANGE UP (ref 20.5–51.1)
MCHC RBC-ENTMCNC: 29.9 PG — SIGNIFICANT CHANGE UP (ref 27–31)
MCHC RBC-ENTMCNC: 32.2 G/DL — SIGNIFICANT CHANGE UP (ref 32–37)
MCV RBC AUTO: 92.8 FL — SIGNIFICANT CHANGE UP (ref 81–99)
MONOCYTES # BLD AUTO: 0.51 K/UL — SIGNIFICANT CHANGE UP (ref 0.1–0.6)
MONOCYTES NFR BLD AUTO: 7.7 % — SIGNIFICANT CHANGE UP (ref 1.7–9.3)
NEUTROPHILS # BLD AUTO: 4.12 K/UL — SIGNIFICANT CHANGE UP (ref 1.4–6.5)
NEUTROPHILS NFR BLD AUTO: 61.8 % — SIGNIFICANT CHANGE UP (ref 42.2–75.2)
NITRITE UR-MCNC: NEGATIVE — SIGNIFICANT CHANGE UP
NRBC # BLD: 0 /100 WBCS — SIGNIFICANT CHANGE UP (ref 0–0)
PH UR: 5.5 — SIGNIFICANT CHANGE UP (ref 5–8)
PLATELET # BLD AUTO: 304 K/UL — SIGNIFICANT CHANGE UP (ref 130–400)
PMV BLD: 9.9 FL — SIGNIFICANT CHANGE UP (ref 7.4–10.4)
POTASSIUM SERPL-MCNC: 4.3 MMOL/L — SIGNIFICANT CHANGE UP (ref 3.5–5)
POTASSIUM SERPL-SCNC: 4.3 MMOL/L — SIGNIFICANT CHANGE UP (ref 3.5–5)
PROT SERPL-MCNC: 7.3 G/DL — SIGNIFICANT CHANGE UP (ref 6–8)
PROT UR-MCNC: NEGATIVE MG/DL — SIGNIFICANT CHANGE UP
RBC # BLD: 4.72 M/UL — SIGNIFICANT CHANGE UP (ref 4.2–5.4)
RBC # FLD: 12.7 % — SIGNIFICANT CHANGE UP (ref 11.5–14.5)
RBC CASTS # UR COMP ASSIST: 1 /HPF — SIGNIFICANT CHANGE UP (ref 0–4)
SODIUM SERPL-SCNC: 144 MMOL/L — SIGNIFICANT CHANGE UP (ref 135–146)
SP GR SPEC: 1.03 — SIGNIFICANT CHANGE UP (ref 1–1.03)
SQUAMOUS # UR AUTO: >36 /HPF — HIGH (ref 0–5)
UROBILINOGEN FLD QL: 0.2 MG/DL — SIGNIFICANT CHANGE UP (ref 0.2–1)
WBC # BLD: 6.65 K/UL — SIGNIFICANT CHANGE UP (ref 4.8–10.8)
WBC # FLD AUTO: 6.65 K/UL — SIGNIFICANT CHANGE UP (ref 4.8–10.8)
WBC UR QL: 10 /HPF — HIGH (ref 0–5)

## 2024-04-26 PROCEDURE — 85025 COMPLETE CBC W/AUTO DIFF WBC: CPT

## 2024-04-26 PROCEDURE — 80053 COMPREHEN METABOLIC PANEL: CPT

## 2024-04-26 PROCEDURE — 36415 COLL VENOUS BLD VENIPUNCTURE: CPT

## 2024-04-26 PROCEDURE — 81001 URINALYSIS AUTO W/SCOPE: CPT

## 2024-04-26 PROCEDURE — 99214 OFFICE O/P EST MOD 30 MIN: CPT | Mod: 25

## 2024-04-26 RX ORDER — ERGOCALCIFEROL 1.25 MG/1
1 CAPSULE ORAL
Qty: 0 | Refills: 0 | DISCHARGE

## 2024-04-26 NOTE — H&P PST ADULT - REASON FOR ADMISSION
Patient is a  64 year old  female presenting to PAST in preparation for BILATERAL BREAST RUPTURED IMPLANT REMOVAL AND CAPSULECTOMY AND IMPLANT INSERTION. BILATERAL MASTOPEXY  on   5/9/24 under general anesthesia by Dr. garcia

## 2024-04-26 NOTE — H&P PST ADULT - NSICDXPASTMEDICALHX_GEN_ALL_CORE_FT
PAST MEDICAL HISTORY:  History of blood transfusion no  adverse reaction    Hypercholesteremia     Seasonal allergies

## 2024-04-26 NOTE — H&P PST ADULT - HISTORY OF PRESENT ILLNESS
pt with b/l ruptured breast implants  now for planned procedure     PATIENT CURRENTLY DENIES CHEST PAIN  SHORTNESS OF BREATH  PALPITATIONS,  DYSURIA, OR UPPER RESPIRATORY INFECTION IN PAST 2 WEEKS  denies travel outside the [place] in the past 30 days    Anesthesia Alert  NO--Difficult Airway  NO--History of neck surgery or radiation  NO--Limited ROM of neck  NO--History of Malignant hyperthermia  NO--No personal or family history of Pseudocholinesterase deficiency.  NO--Prior Anesthesia Complication  NO--Latex Allergy  NO--Loose teeth  NO--History of Rheumatoid Arthritis  NO--Bleeding risk  NO--ZUHAIR  NO--Other_____    PT DENIES ANY RASHES, ABRASION, OR OPEN WOUNDS OR CUTS    AS PER THE PT, THIS IS HIS/HER COMPLETE MEDICAL AND SURGICAL HX, INCLUDING MEDICATIONS PRESCRIBED AND OVER THE COUNTER    Patient verbalized understanding of instructions and was given the opportunity to ask questions and have them answered.    pt denies any suicidal ideation or thoughts, pt states not a threat to self or others    Capsular contracture of breast implant, subsequent encounter    Encounter for other preprocedural examination    FH: heart attack (Father)    FH: HTN (hypertension)    Seasonal allergies    History of blood transfusion    History of carpal tunnel repair    History of colostomy reversal    H/O hernia repair    H/O bilateral breast implants    Bowel perforation    78066-54; 18729-87; 41898-74; 75978-59; 67871-90    SysAdmin_VstLnk    [facility] Activity Status Index (DASI)  42.7 points  The higher the score (maximum 58.2), the higher the functional status.  7.99 METs    Revised Cardiac Risk Index for Pre-Operative Risk   0 points  Class I Risk  3.9 %  30-day risk of death, MI, or cardiac arres

## 2024-04-27 DIAGNOSIS — T85.44XD CAPSULAR CONTRACTURE OF BREAST IMPLANT, SUBSEQUENT ENCOUNTER: ICD-10-CM

## 2024-04-27 DIAGNOSIS — Z01.818 ENCOUNTER FOR OTHER PREPROCEDURAL EXAMINATION: ICD-10-CM

## 2024-04-30 DIAGNOSIS — M79.2 NEURALGIA AND NEURITIS, UNSPECIFIED: ICD-10-CM

## 2024-04-30 RX ORDER — GABAPENTIN 300 MG/1
300 CAPSULE ORAL
Qty: 7 | Refills: 0 | Status: ACTIVE | COMMUNITY
Start: 2024-04-30 | End: 1900-01-01

## 2024-05-02 ENCOUNTER — NON-APPOINTMENT (OUTPATIENT)
Age: 64
End: 2024-05-02

## 2024-05-08 NOTE — ASU PATIENT PROFILE, ADULT - FALL HARM RISK - UNIVERSAL INTERVENTIONS
Bed in lowest position, wheels locked, appropriate side rails in place/Call bell, personal items and telephone in reach/Instruct patient to call for assistance before getting out of bed or chair/Non-slip footwear when patient is out of bed/[place] to call system/Physically safe environment - no spills, clutter or unnecessary equipment/Purposeful Proactive Rounding/Room/bathroom lighting operational, light cord in reach

## 2024-05-09 ENCOUNTER — APPOINTMENT (OUTPATIENT)
Dept: PLASTIC SURGERY | Facility: AMBULATORY SURGERY CENTER | Age: 64
End: 2024-05-09
Payer: COMMERCIAL

## 2024-05-09 ENCOUNTER — RESULT REVIEW (OUTPATIENT)
Age: 64
End: 2024-05-09

## 2024-05-09 ENCOUNTER — OUTPATIENT (OUTPATIENT)
Dept: OUTPATIENT SERVICES | Facility: HOSPITAL | Age: 64
LOS: 1 days | Discharge: HOME | End: 2024-05-09
Payer: COMMERCIAL

## 2024-05-09 ENCOUNTER — TRANSCRIPTION ENCOUNTER (OUTPATIENT)
Age: 64
End: 2024-05-09

## 2024-05-09 ENCOUNTER — APPOINTMENT (OUTPATIENT)
Dept: PLASTIC SURGERY | Facility: AMBULATORY SURGERY CENTER | Age: 64
End: 2024-05-09
Payer: SELF-PAY

## 2024-05-09 VITALS
HEIGHT: 63 IN | RESPIRATION RATE: 18 BRPM | WEIGHT: 162.92 LBS | SYSTOLIC BLOOD PRESSURE: 131 MMHG | TEMPERATURE: 98 F | HEART RATE: 67 BPM | DIASTOLIC BLOOD PRESSURE: 77 MMHG | OXYGEN SATURATION: 97 %

## 2024-05-09 VITALS
DIASTOLIC BLOOD PRESSURE: 70 MMHG | RESPIRATION RATE: 18 BRPM | HEART RATE: 76 BPM | SYSTOLIC BLOOD PRESSURE: 113 MMHG | OXYGEN SATURATION: 94 %

## 2024-05-09 DIAGNOSIS — Z98.890 OTHER SPECIFIED POSTPROCEDURAL STATES: Chronic | ICD-10-CM

## 2024-05-09 DIAGNOSIS — Z98.82 BREAST IMPLANT STATUS: Chronic | ICD-10-CM

## 2024-05-09 DIAGNOSIS — T85.43XD LEAKAGE OF BREAST PROSTHESIS AND IMPLANT, SUBSEQUENT ENCOUNTER: ICD-10-CM

## 2024-05-09 DIAGNOSIS — T85.44XD CAPSULAR CONTRACTURE OF BREAST IMPLANT, SUBSEQUENT ENCOUNTER: ICD-10-CM

## 2024-05-09 DIAGNOSIS — K63.1 PERFORATION OF INTESTINE (NONTRAUMATIC): Chronic | ICD-10-CM

## 2024-05-09 PROCEDURE — 19340 INSJ BREAST IMPLT SM D MAST: CPT | Mod: 50

## 2024-05-09 PROCEDURE — 88300 SURGICAL PATH GROSS: CPT

## 2024-05-09 PROCEDURE — 19316 MASTOPEXY: CPT | Mod: 50

## 2024-05-09 PROCEDURE — 88300 SURGICAL PATH GROSS: CPT | Mod: 26,59

## 2024-05-09 PROCEDURE — 19330 RMVL RUPTURED BREAST IMPLANT: CPT | Mod: 50

## 2024-05-09 PROCEDURE — C9399: CPT

## 2024-05-09 PROCEDURE — 88305 TISSUE EXAM BY PATHOLOGIST: CPT

## 2024-05-09 PROCEDURE — 19370 REVJ PERI-IMPLT CAPSULE BRST: CPT | Mod: 50,59

## 2024-05-09 PROCEDURE — C1789: CPT

## 2024-05-09 PROCEDURE — 88305 TISSUE EXAM BY PATHOLOGIST: CPT | Mod: 26

## 2024-05-09 RX ORDER — APREPITANT 80 MG/1
40 CAPSULE ORAL ONCE
Refills: 0 | Status: COMPLETED | OUTPATIENT
Start: 2024-05-09 | End: 2024-05-09

## 2024-05-09 RX ORDER — SODIUM CHLORIDE 9 MG/ML
1000 INJECTION, SOLUTION INTRAVENOUS
Refills: 0 | Status: DISCONTINUED | OUTPATIENT
Start: 2024-05-09 | End: 2024-05-09

## 2024-05-09 RX ORDER — OFLOXACIN 0.3 %
1 DROPS OPHTHALMIC (EYE) ONCE
Refills: 0 | Status: COMPLETED | OUTPATIENT
Start: 2024-05-09 | End: 2024-05-09

## 2024-05-09 RX ORDER — HYDROMORPHONE HYDROCHLORIDE 2 MG/ML
0.53 INJECTION INTRAMUSCULAR; INTRAVENOUS; SUBCUTANEOUS
Refills: 0 | Status: DISCONTINUED | OUTPATIENT
Start: 2024-05-09 | End: 2024-05-09

## 2024-05-09 RX ORDER — GABAPENTIN 400 MG/1
0 CAPSULE ORAL
Refills: 0 | DISCHARGE

## 2024-05-09 RX ORDER — HEPARIN SODIUM 5000 [USP'U]/ML
5000 INJECTION INTRAVENOUS; SUBCUTANEOUS ONCE
Refills: 0 | Status: COMPLETED | OUTPATIENT
Start: 2024-05-09 | End: 2024-05-09

## 2024-05-09 RX ORDER — TRAMADOL HYDROCHLORIDE 50 MG/1
1 TABLET ORAL
Qty: 8 | Refills: 0
Start: 2024-05-09 | End: 2024-05-10

## 2024-05-09 RX ORDER — SCOPALAMINE 1 MG/3D
1 PATCH, EXTENDED RELEASE TRANSDERMAL
Refills: 0 | Status: COMPLETED | OUTPATIENT
Start: 2024-05-09 | End: 2024-05-09

## 2024-05-09 RX ORDER — ONDANSETRON 8 MG/1
4 TABLET, FILM COATED ORAL ONCE
Refills: 0 | Status: COMPLETED | OUTPATIENT
Start: 2024-05-09 | End: 2024-05-09

## 2024-05-09 RX ORDER — ACETAMINOPHEN 500 MG
2 TABLET ORAL
Refills: 0 | DISCHARGE

## 2024-05-09 RX ORDER — MONTELUKAST 4 MG/1
1 TABLET, CHEWABLE ORAL
Qty: 90 | Refills: 0
Start: 2024-05-09 | End: 2024-08-06

## 2024-05-09 RX ORDER — OXYCODONE AND ACETAMINOPHEN 5; 325 MG/1; MG/1
2 TABLET ORAL ONCE
Refills: 0 | Status: DISCONTINUED | OUTPATIENT
Start: 2024-05-09 | End: 2024-05-09

## 2024-05-09 RX ORDER — MEPERIDINE HYDROCHLORIDE 50 MG/ML
12.5 INJECTION INTRAMUSCULAR; INTRAVENOUS; SUBCUTANEOUS ONCE
Refills: 0 | Status: DISCONTINUED | OUTPATIENT
Start: 2024-05-09 | End: 2024-05-09

## 2024-05-09 RX ORDER — ROSUVASTATIN CALCIUM 5 MG/1
1 TABLET ORAL
Refills: 0 | DISCHARGE

## 2024-05-09 RX ADMIN — ONDANSETRON 4 MILLIGRAM(S): 8 TABLET, FILM COATED ORAL at 13:54

## 2024-05-09 RX ADMIN — SODIUM CHLORIDE 100 MILLILITER(S): 9 INJECTION, SOLUTION INTRAVENOUS at 11:39

## 2024-05-09 RX ADMIN — APREPITANT 40 MILLIGRAM(S): 80 CAPSULE ORAL at 07:12

## 2024-05-09 RX ADMIN — HYDROMORPHONE HYDROCHLORIDE 0.53 MILLIGRAM(S): 2 INJECTION INTRAMUSCULAR; INTRAVENOUS; SUBCUTANEOUS at 13:51

## 2024-05-09 RX ADMIN — Medication 1 DROP(S): at 13:50

## 2024-05-09 RX ADMIN — Medication 1 DROP(S): at 13:51

## 2024-05-09 RX ADMIN — HEPARIN SODIUM 5000 UNIT(S): 5000 INJECTION INTRAVENOUS; SUBCUTANEOUS at 07:12

## 2024-05-09 RX ADMIN — SCOPALAMINE 1 PATCH: 1 PATCH, EXTENDED RELEASE TRANSDERMAL at 07:13

## 2024-05-09 RX ADMIN — HYDROMORPHONE HYDROCHLORIDE 0.53 MILLIGRAM(S): 2 INJECTION INTRAMUSCULAR; INTRAVENOUS; SUBCUTANEOUS at 12:16

## 2024-05-09 NOTE — ASU DISCHARGE PLAN (ADULT/PEDIATRIC) - CARE PROVIDER_API CALL
Radha Pan  Plastic Surgery  [address] [place] [address], [address]  [place] [address]  Phone: [phone]  Fax: [phone]  Established Patient  Follow Up Time: 1 week

## 2024-05-09 NOTE — BRIEF OPERATIVE NOTE - NSICDXBRIEFPROCEDURE_GEN_ALL_CORE_FT
PROCEDURES:  Capsulectomy, breast, with implant removal 09-May-2024 11:00:01  Tabitha Guevara  Mastopexy, periareolar 09-May-2024 11:00:26  Tabitha Guevara  Insertion of silicone breast implant 09-May-2024 11:00:40  Tabitha Guevara

## 2024-05-09 NOTE — BRIEF OPERATIVE NOTE - OPERATION/FINDINGS
C/w b/l breast capsular contracture and breast ptosis. S/p Bilateral total capsulectomy, b/l lateral capsulorraphy & removal of b/l 533cc silicone rutpred implants with placement of b/l 520cc silicone implants with taty-areolar J incision mastopexy C/w b/l breast capsular contracture and breast ptosis. S/p Bilateral total capsulectomy, b/l lateral capsulorraphy & removal of b/l 533cc silicone ruptured implants with placement of b/l 520cc silicone implants with taty-areolar J incision mastopexy

## 2024-05-09 NOTE — BRIEF OPERATIVE NOTE - NSICDXBRIEFPREOP_GEN_ALL_CORE_FT
PRE-OP DIAGNOSIS:  Breast implant capsular contracture 09-May-2024 11:01:01  Tabitha Guevara  Ptosis of both breasts 09-May-2024 11:01:10  Tabitha Guevara

## 2024-05-09 NOTE — ASU DISCHARGE PLAN (ADULT/PEDIATRIC) - NS MD DC FALL RISK RISK
For information on Fall & Injury Prevention, visit: [email or web address] OR  [email or web address] OR  https://www.cdc.gov/steadi/patient.html

## 2024-05-09 NOTE — CHART NOTE - NSCHARTNOTEFT_GEN_A_CORE
PACU ANESTHESIA ADMISSION NOTE      Procedure: Removal of ruptured implant from both breasts    Capsulectomy, breast, with implant removal    Mastopexy, periareolar    Insertion of silicone breast implant      Post op diagnosis:      ____  Intubated  TV:______       Rate: ______      FiO2: ______    __x__  Patent Airway    __x__  Full return of protective reflexes    __x__  Full recovery from anesthesia / back to baseline status    Vitals:  temp(F) 98  /56  spo2 98  RR 17  pulse 70    Mental Status:  __x__ Awake   _____ Alert   _____ Drowsy   _____ Sedated    Nausea/Vomiting:  _x___ NO  ______Yes,   See Post - Op Orders          Pain Scale (0-10):  _____    Treatment: ____ None    __x__ See Post - Op/PCA Orders    Post - Operative Fluids:   ____ Oral   __x__ See Post - Op Orders    Plan: Discharge:   _x ___Home       _____Floor     _____Critical Care    _____  Other:_________________    Comments: uneventful anesthesia course no complications. Vitals stable. Pt transferred to PACU
Patient complaining of right eye discomfort, gritty feeling, and tearing. On examination, noted eye to be mildly injected, teary. No discharge or gross abnormality. EOMI. Denies visual changes. Likely diagnosis of corneal abrasion. Treatment imperatively started - tetracaine 0.5% and ofloxacin 0.3% drops applied to eye. Instructions given to continue drops every 6 hours for next 24 hours. If symptoms do not improve, patient provided contact information of ophthalmology clinic.

## 2024-05-09 NOTE — ASU DISCHARGE PLAN (ADULT/PEDIATRIC) - ASU DC SPECIAL INSTRUCTIONSFT
Please follow up with Dr. Pan. Please call her office at the number provided within 48 hours of leaving the hospital to set up this appointment.     Pain meds:   - Gabapentin twice daily  - Extra strength tylenol routinely. Please do not exceed 4,000mg in one day.   - Tramadol every 6 to 8 hours or as needed for severe pain     Keep surgical bra on until cleared.     Sleep on your back    Keep bandages dry      ACTIVITY: Please do not lift anything heavier than a gallon of milk or about 5 pounds    ANTIBIOTICS: Please take any prescribed antibiotics as directed. These will be sent to your designated pharmacy    Dressing: please leave dressing in place until follow up. Please keep the dressing clean and dry while you recover.

## 2024-05-10 LAB — SURGICAL PATHOLOGY STUDY: SIGNIFICANT CHANGE UP

## 2024-05-14 ENCOUNTER — APPOINTMENT (OUTPATIENT)
Dept: PLASTIC SURGERY | Facility: CLINIC | Age: 64
End: 2024-05-14
Payer: COMMERCIAL

## 2024-05-14 DIAGNOSIS — T85.43XA LEAKAGE OF BREAST PROSTHESIS AND IMPLANT, INITIAL ENCOUNTER: ICD-10-CM

## 2024-05-14 PROBLEM — E78.00 PURE HYPERCHOLESTEROLEMIA, UNSPECIFIED: Chronic | Status: ACTIVE | Noted: 2024-04-26

## 2024-05-14 PROCEDURE — 99024 POSTOP FOLLOW-UP VISIT: CPT

## 2024-05-15 PROBLEM — T85.43XA RUPTURED SILICONE BREAST IMPLANT: Status: ACTIVE | Noted: 2024-05-15

## 2024-05-15 NOTE — ASSESSMENT
[FreeTextEntry1] : 63 y/o female who presents with c/o left breast pain and firmness x 1 year, s/p bilateral breast silicone implant augmentation (2009) with bilateral symptomatic capsular contracture (right grade 3 and left grade 4) and breast ptosis (grade 2)  Now POD#5 s/p bilateral ruptured implant removal, capsulectomy and concurrent mastopexy. Doing well.   - dressings changed - pt reassured, no concerning findings on exam   - continue surgical bra and sleeping on the back - may use warm compresses for left breast discomfort - pathology reviewed - all questions were answered - F/U next week for post-op visit as scheduled  Seen with Dr. Kruse.

## 2024-05-15 NOTE — HISTORY OF PRESENT ILLNESS
[FreeTextEntry1] : 62 y/o female with h/o HLD, who presents for evaluation of bilateral pre-pectoral breast implants. Pt had silicone breast implants placed via IMF incision by Dr. Antoine Villafana in 2009 (Pt does not have implant card).  Pt denies any issues post-op, and was initially happy with her chest size (wearing a 38C) however would ideally like to be smaller. Pt states that she started noticing left breast pain approx 1 years ago and notes that the implant is harder on the left side compared to the right. Pt had a mammo & US completed Feb 2023 (both of which were neg for malignancy, Birads 1). Pt also had a b/l breast MRI march 2023 that was WNL, no mention of CC.   Here to discuss treatment options  Current bra cup size: 38D cup Desired volume outcome and shape: "smaller"  No personal or family h/o breast cancer No h/o DM Non smoker  Occ:    Interval hx (3/1/24): Pt Here today to discuss MRI results.  She has implant card info - REF  BL.  MRI demonstrates b/l intracapsular implant rupture. Patient would like to remain the same bra size so would like a similarly sized implant.  No new health issues.  Interval hx (5/14/24) Pt here POD#5 s/p removal and replacement of BL breast implants, capsulectomy with mastopexy concerned with increased pain and discomfort of left breast after a forceful sneeze last night. Told to come in by Dr. Kruse who was on call overnight. Taking all prescribed medications as instructed. Denies any f/c, drainage of trauma to the breast.

## 2024-05-15 NOTE — DATA REVIEWED
[FreeTextEntry1] : 	 Jason Accession Number : [id number] Patient:     ELLE JASSO   Accession:                             [id number]  Collected Date/Time:                   5/9/2024 08:30 EDT Received Date/Time:                    5/9/2024 11:48 EDT  Surgical Pathology Report - Auth (Verified)  Specimen(s) Submitted 1  Left breast implant Time obtained: 8:30 am 2  Left breast capsule Time obtained: 8:40 am 3  Right breast implant Time obtained: 9:00 am 4  Right breast capsule Time obtained: 9:10 am   Final Diagnosis 1.  Breast, left implant, removal and exchange: - Ruptured synthetic breast implant (macroscopic diagnosis).  2.  Breast, left tissue, capsulectomy: - Extensively calcified dense linear fibrous connective tissue containing embedded refractile non-polarizable foreign material and silicone granulomata; consistent with a synthetic breast implant soft tissue capsule.  3.  Breast, right implant, removal and exchange: - Ruptured synthetic breast implant (macroscopic diagnosis).  4.  Breast, right tissue, capsulectomy: - Extensively calcified dense linear fibrous connective tissue; consistent with a synthetic breast implant soft tissue capsule. - Atrophic fatty breast tissue without histopathologic abnormality. Verified by: Praneeth Barry M.D. (Electronic Signature)  Reported on: 05/10/24 15:07 EDT, [place] [address] Phone: [phone]   Fax: [phone]

## 2024-05-15 NOTE — PHYSICAL EXAM
[Bra Size: _______] : Bra Size: [unfilled] [de-identified] : ATNC [de-identified] : RICOs [de-identified] : supple [de-identified] : well-developed pleasant female, NAD [de-identified] : Non labored on RA [de-identified] : Bilateral breasts soft with diffuse bruising and swelling, implants in good positions and symmetric, incisions healing well, c/d/i, no erythema or evidence of fluid collection, good overall symmetry

## 2024-05-20 ENCOUNTER — APPOINTMENT (OUTPATIENT)
Dept: PLASTIC SURGERY | Facility: CLINIC | Age: 64
End: 2024-05-20
Payer: COMMERCIAL

## 2024-05-20 DIAGNOSIS — T85.44XS CAPSULAR CONTRACTURE OF BREAST IMPLANT, SEQUELA: ICD-10-CM

## 2024-05-20 PROCEDURE — 99024 POSTOP FOLLOW-UP VISIT: CPT

## 2024-05-20 NOTE — HISTORY OF PRESENT ILLNESS
[FreeTextEntry1] : 62 y/o female with h/o HLD, who presents for evaluation of bilateral pre-pectoral breast implants. Pt had silicone breast implants placed via IMF incision by Dr. Antoine Villafana in 2009 (Pt does not have implant card).  Pt denies any issues post-op, and was initially happy with her chest size (wearing a 38C) however would ideally like to be smaller. Pt states that she started noticing left breast pain approx 1 years ago and notes that the implant is harder on the left side compared to the right. Pt had a mammo & US completed Feb 2023 (both of which were neg for malignancy, Birads 1). Pt also had a b/l breast MRI march 2023 that was WNL, no mention of CC.   Here to discuss treatment options  Current bra cup size: 38D cup Desired volume outcome and shape: "smaller"  No personal or family h/o breast cancer No h/o DM Non smoker  Occ:    Interval hx (3/1/24): Pt Here today to discuss MRI results.  She has implant card info - REF  BL.  MRI demonstrates b/l intracapsular implant rupture. Patient would like to remain the same bra size so would like a similarly sized implant.  No new health issues.  Interval hx (5/14/24) Pt here POD#5 s/p removal and replacement of BL breast implants, capsulectomy with mastopexy concerned with increased pain and discomfort of left breast after a forceful sneeze last night. Told to come in by Dr. Kruse who was on call overnight. Taking all prescribed medications as instructed. Denies any f/c, drainage of trauma to the breast.   Interval hx (5/20/24) Pt here POD#11 s/p removal and replacement of BL breast implants, capsulectomy with mastopexy. Denies f/c, drainage or significant pain. Wearing compression bra Pt started Singulair nightly since surgery however states it makes her feel "weird", coughing more frequently, feels a heaviness in her chest, denies chest pain, some MCDUFFIE, no LE pain or swelling. Ambulating.

## 2024-05-20 NOTE — ASSESSMENT
[FreeTextEntry1] : 63 y/o female who presents with c/o left breast pain and firmness x 1 year, s/p bilateral breast silicone implant augmentation (2009) with bilateral symptomatic capsular contracture (right grade 3 and left grade 4) and breast ptosis (grade 2)  Now POD#11 s/p bilateral ruptured implant removal, capsulectomy and concurrent mastopexy. Doing well.   - d/c all adhesives, Aquaphor only - Ok to start silicone based scar cream in 2 weeks - continue surgical bra, ok to switch to sports bra, aware she must be mindful of IMF incision. Must put additional padding if bra rubs up any incision site - No heavy lifting / pushing / pulling - Ok to shower, no submerging  - Recommended pt d/c Singulair in light of ADEs. Advised pt to start gentle implant massage  - pathology reviewed - all questions were answered - F/u 1 month for clearance to return to full activity

## 2024-05-20 NOTE — PHYSICAL EXAM
[de-identified] : well-developed pleasant female, NAD [de-identified] : Non labored on RA [de-identified] : SYEDR [de-identified] : Bilateral breasts soft with resolving swelling, implants in good positions and symmetric, incisions healing well, c/d/i, no erythema or evidence of fluid collection, b/l  nipples viable with sensation intact, excellent symmetry

## 2024-05-20 NOTE — DATA REVIEWED
[FreeTextEntry1] : Jason Accession Number : [id number] Patient:     ELLE JASSO   Accession:                             [id number]  Collected Date/Time:                   5/9/2024 08:30 EDT Received Date/Time:                    5/9/2024 11:48 EDT  Surgical Pathology Report - Auth (Verified)  Specimen(s) Submitted 1  Left breast implant Time obtained: 8:30 am 2  Left breast capsule Time obtained: 8:40 am 3  Right breast implant Time obtained: 9:00 am 4  Right breast capsule Time obtained: 9:10 am   Final Diagnosis 1.  Breast, left implant, removal and exchange: - Ruptured synthetic breast implant (macroscopic diagnosis).  2.  Breast, left tissue, capsulectomy: - Extensively calcified dense linear fibrous connective tissue containing embedded refractile non-polarizable foreign material and silicone granulomata; consistent with a synthetic breast implant soft tissue capsule.  3.  Breast, right implant, removal and exchange: - Ruptured synthetic breast implant (macroscopic diagnosis).  4.  Breast, right tissue, capsulectomy: - Extensively calcified dense linear fibrous connective tissue; consistent with a synthetic breast implant soft tissue capsule. - Atrophic fatty breast tissue without histopathologic abnormality. Verified by: Praneeth Barry M.D. (Electronic Signature)  Reported on: 05/10/24 15:07 EDT, [place] [address] Phone: [phone]   Fax: [phone] _________________________________________________________________  Clinical Information Bilateral breast ruptured implant removed, capsulectomy, lateral capsulorrhaphy, implant exchange and mastopexy  Perioperative Diagnosis Bilateral breast implant and breast ptosis  Postoperative Diagnosis  Bilateral breast implant and breast ptosis  Gross Description 1.  The specimen is received without fixative, labeled "left breast implant" and consists of a soft round smooth ruptured implant, measuring 15 x 15 x 2.5 cm, and weighing 515 g. The rupture measures 3 cm and the implant is inscribed with "style 15, lot [id number], Allergan, 5 3 3 cc". Gross description only. No sections are submitted.  2.  The specimen is received in formalin labeled "left breast capsule" and consists of tan-white to yellow breast capsule measuring 10 x 8 x 4 cm and weighing 74 g.  The specimen is serially sectioned revealing decalcified surface and representative sections are submitted after short decalcification.  (1 block)  3.  The specimen is received without fixative, labeled "left breast implant" and consists of a soft round smooth ruptured implant, measuring 15 x 14.5 x 2.5 cm, and weighing 534  g.  The implant is inscribed with "style 15, LOT [id number], Allergan, 5 3 3 cc". Gross description only. No sections are submitted.  4.  The specimen is received in formalin labeled "right breast capsule" and consists of tan-white to yellow breast capsule measuring 9 x 6 x 2 cm and weighing 23 g.  The specimen is serially sectioned and representative sections are submitted.  (1 block)  05/09/2024 12:55:25 EDT  st   Disclaimer In addition to other data that may appear on the specimen containers, all labels have been inspected to confirm the presence of the patient's name and date of birth.  Specimen was received and underwent gross examination at [facility], [address] [place] [address].  Ordered by: RORY DANIELSON       Collected/Examined: [id number] 08:30AM       Verification Required       Stage: Final       Performed at: [facility]       Resulted: 10May2024 03:07PM       Last Updated: 10May2024 03:08PM       Accession: [id number]       Results Hx:	 Goal	[id number] Item Name		08:30AM	09:59AM [facility]		Report 1	Report 2  * indicates comments or annotations. Hover * or Report to view full result. Right click on result to view in new window.  Report #1 - [id number] 08:30AM - [facility] Accession Number : [id number] Patient:     ELLE JASSO   Accession:                             [id number]  Collected Date/Time:                   5/9/2024 08:30 EDT Received Date/Time:                    5/9/2024 11:48 EDT  Surgical Pathology Report - Auth (Verified)  Specimen(s) Submitted 1  Left breast implant Time obtained: 8:30 am 2  Left breast capsule Time obtained: 8:40 am 3  Right breast implant Time obtained: 9:00 am 4  Right breast capsule Time obtained: 9:10 am   Final Diagnosis 1.  Breast, left implant, removal and exchange: - Ruptured synthetic breast implant (macroscopic diagnosis).  2.  Breast, left tissue, capsulectomy: - Extensively calcified dense linear fibrous connective tissue containing embedded refractile non-polarizable foreign material and silicone granulomata; consistent with a synthetic breast implant soft tissue capsule.  3.  Breast, right implant, removal and exchange: - Ruptured synthetic breast implant (macroscopic diagnosis).  4.  Breast, right tissue, capsulectomy: - Extensively calcified dense linear fibrous connective tissue; consistent with a synthetic breast implant soft tissue capsule. - Atrophic fatty breast tissue without histopathologic abnormality. Verified by: Praneeth Barry M.D. (Electronic Signature)  Reported on: 05/10/24 15:07 EDT, [facility], [address] [place] [address] Phone: [phone]   Fax: [phone] _________________________________________________________________  Clinical Information Bilateral breast ruptured implant removed, capsulectomy, lateral capsulorrhaphy, implant exchange and mastopexy  Perioperative Diagnosis Bilateral breast implant and breast ptosis  Postoperative Diagnosis  Bilateral breast implant and breast ptosis  Gross Description 1.  The specimen is received without fixative, labeled "left breast implant" and consists of a soft round smooth ruptured implant, measuring 15 x 15 x 2.5 cm, and weighing 515 g. The rupture measures 3 cm and the implant is inscribed with "style 15, lot [id number], Allergan, 5 3 3 cc". Gross description only. No sections are submitted.  2.  The specimen is received in formalin labeled "left breast capsule" and consists of tan-white to yellow breast capsule measuring 10 x 8 x 4 cm and weighing 74 g.  The specimen is serially sectioned revealing decalcified surface and representative sections are submitted after short decalcification.  (1 block)  3.  The specimen is received without fixative, labeled "left breast implant" and consists of a soft round smooth ruptured implant, measuring 15 x 14.5 x 2.5 cm, and weighing 534  g.  The implant is inscribed with "style 15, LOT [id number], Allergan, 5 3 3 cc". Gross description only. No sections are submitted.  4.  The specimen is received in formalin labeled "right breast capsule" and consists of tan-white to yellow breast capsule measuring 9 x 6 x 2 cm and weighing 23 g.  The specimen is serially sectioned and representative sections are submitted.  (1 block)  05/09/2024 12:55:25 EDT  st   Disclaimer In addition to other data that may appear on the specimen containers, all labels have been inspected to confirm the presence of the patient's name and date of birth.  Specimen was received and underwent gross examination at [place] [address].  Report #2 - [id number] 09:59AM [facility] Accession Number : [id number] Patient:   ELLE JASSO   Accession:                             [id number]  Collected Date/Time:                   6/13/2022 09:59 EDT Received Date/Time:                    6/13/2022 12:07 EDT  Surgical Pathology Report - Auth (Verified)  Specimen(s) Submitted Right knee shavings  Final Diagnosis Right knee shavings: - Fibrocartilage with degenerative change and synovium.  Verified by: Kathy Patel M.D. (Electronic Signature) Reported on: 06/15/22 13:04 EDT, [place] [address] Phone: [phone]   Fax: [phone] _________________________________________________________________  Clinical Information Right knee arthroscopy  COVID (-)  Perioperative Diagnosis Right meniscus tear  Gross Description The specimen is received in formalin labeled "right knee shavings".  The specimen consists of multiple pieces of tan and gray soft to firm tissue, measuring 0.5 x 0.5 x 0.2 cm in aggregate.  Representative sections are submitted.  (1 Block)  Specimen was received and underwent gross examination at [facility], [address] [place] [address].  06/13/2022 17:24:48 EDT  jl Details:	 Scheduled:	 Status:	Resulted: Requires Verification For:	 Recorded as History:	10May2024 03:08PM Overdue:	10Aug2024 12:00AM To Be Performed:	 Communicated By:	Recorded Priority:	Routine Ordered By:	RORY DANIELSON Supervised By:	RORY DANIELSON Managed By:	RORY DANIELSON Authorization:	Not Required Performing Instructions:	 Patient Instructions:	 Order Instructions:	 Questions:	          (none) Add'l Details:	 Financial Auth:	 Authorization #:	 Appt. Status:	Appointment Not Needed Effective:	10May2024 03:08PM Expires:	10May2024 03:08PM Done:	09May2024 08:30AM Order #:	[id number] Requisition #:	[id number] Label Type:	 Collection:	Collection Specimen Identifier:	 ID:	 CPT:	 LOINC:	 SNOMED:	 Type:	 Charges:	None Will Be Collected in Office?	No View link item history	 Goals:	None Charging:	 Override Encounter Details: Special Billing:	 Account #:	 Injury Date:	5/10/2024 3:08:11 PM Description:	 Encounters:	 Creation:	09May2024 Result Review RORY DANIELSON (Plastic Surgery)  Collection:	None Specified Be Done By:	None Specified Scheduled:	None Specified Performed:	None Specified Charge :	None Specified Annotations:	          (none)

## 2024-06-17 ENCOUNTER — APPOINTMENT (OUTPATIENT)
Dept: PLASTIC SURGERY | Facility: CLINIC | Age: 64
End: 2024-06-17
Payer: COMMERCIAL

## 2024-06-17 PROCEDURE — 99024 POSTOP FOLLOW-UP VISIT: CPT

## 2024-06-17 RX ORDER — DOXYCYCLINE HYCLATE 100 MG/1
100 TABLET ORAL
Qty: 3 | Refills: 2 | Status: ACTIVE | COMMUNITY
Start: 2024-06-17 | End: 1900-01-01

## 2024-06-17 NOTE — HISTORY OF PRESENT ILLNESS
[FreeTextEntry1] : 64 y/o female with h/o HLD, who presents for evaluation of bilateral pre-pectoral breast implants. Pt had silicone breast implants placed via IMF incision by Dr. Antoine Villafana in 2009 (Pt does not have implant card).  Pt denies any issues post-op, and was initially happy with her chest size (wearing a 38C) however would ideally like to be smaller. Pt states that she started noticing left breast pain approx 1 years ago and notes that the implant is harder on the left side compared to the right. Pt had a mammo & US completed Feb 2023 (both of which were neg for malignancy, Birads 1). Pt also had a b/l breast MRI march 2023 that was WNL, no mention of CC.   Here to discuss treatment options  Current bra cup size: 38D cup Desired volume outcome and shape: "smaller"  No personal or family h/o breast cancer No h/o DM Non smoker  Occ:    Interval hx (3/1/24): Pt Here today to discuss MRI results.  She has implant card info - REF  BL.  MRI demonstrates b/l intracapsular implant rupture. Patient would like to remain the same bra size so would like a similarly sized implant.  No new health issues.  Interval hx (5/14/24) Pt here POD#5 s/p removal and replacement of BL breast implants, capsulectomy with mastopexy concerned with increased pain and discomfort of left breast after a forceful sneeze last night. Told to come in by Dr. Kruse who was on call overnight. Taking all prescribed medications as instructed. Denies any f/c, drainage of trauma to the breast.   Interval hx (5/20/24) Pt here POD#11 s/p removal and replacement of BL breast implants, capsulectomy with mastopexy. Denies f/c, drainage or significant pain. Wearing compression bra Pt started Singulair nightly since surgery however states it makes her feel "weird", coughing more frequently, feels a heaviness in her chest, denies chest pain, some MCDUFFIE, no LE pain or swelling. Ambulating.   Interval hx (6/17/24):  6 weeks s/p removal and replacement of BL breast implants, capsulectomy with mastopexy.   She is happy with appearance of breasts; she is very happy with her new cleavage and no longer has implant at the side of her breasts when laying down.  Coughing complaint as last visit has resolved.

## 2024-06-17 NOTE — PHYSICAL EXAM
[de-identified] : well-developed pleasant female, NAD [de-identified] : Non labored on RA [de-identified] : SYEDR [de-identified] : Bilateral breasts soft, implants in good positions and symmetric, incisions healing well, no fluid collection, b/l  nipples viable with sensation intact, excellent symmetry

## 2024-06-17 NOTE — DATA REVIEWED
[FreeTextEntry1] : Jason Accession Number : [id number] Patient:     ELLE JASSO   Accession:                             [id number]  Collected Date/Time:                   5/9/2024 08:30 EDT Received Date/Time:                    5/9/2024 11:48 EDT  Surgical Pathology Report - Auth (Verified)  Specimen(s) Submitted 1  Left breast implant Time obtained: 8:30 am 2  Left breast capsule Time obtained: 8:40 am 3  Right breast implant Time obtained: 9:00 am 4  Right breast capsule Time obtained: 9:10 am   Final Diagnosis 1.  Breast, left implant, removal and exchange: - Ruptured synthetic breast implant (macroscopic diagnosis).  2.  Breast, left tissue, capsulectomy: - Extensively calcified dense linear fibrous connective tissue containing embedded refractile non-polarizable foreign material and silicone granulomata; consistent with a synthetic breast implant soft tissue capsule.  3.  Breast, right implant, removal and exchange: - Ruptured synthetic breast implant (macroscopic diagnosis).  4.  Breast, right tissue, capsulectomy: - Extensively calcified dense linear fibrous connective tissue; consistent with a synthetic breast implant soft tissue capsule. - Atrophic fatty breast tissue without histopathologic abnormality. Verified by: Praneeth Barry M.D. (Electronic Signature)  Reported on: 05/10/24 15:07 EDT, [place] [address] Phone: [phone]   Fax: [phone] _________________________________________________________________  Clinical Information Bilateral breast ruptured implant removed, capsulectomy, lateral capsulorrhaphy, implant exchange and mastopexy  Perioperative Diagnosis Bilateral breast implant and breast ptosis  Postoperative Diagnosis  Bilateral breast implant and breast ptosis  Gross Description 1.  The specimen is received without fixative, labeled "left breast implant" and consists of a soft round smooth ruptured implant, measuring 15 x 15 x 2.5 cm, and weighing 515 g. The rupture measures 3 cm and the implant is inscribed with "style 15, lot [id number], Allergan, 5 3 3 cc". Gross description only. No sections are submitted.  2.  The specimen is received in formalin labeled "left breast capsule" and consists of tan-white to yellow breast capsule measuring 10 x 8 x 4 cm and weighing 74 g.  The specimen is serially sectioned revealing decalcified surface and representative sections are submitted after short decalcification.  (1 block)  3.  The specimen is received without fixative, labeled "left breast implant" and consists of a soft round smooth ruptured implant, measuring 15 x 14.5 x 2.5 cm, and weighing 534  g.  The implant is inscribed with "style 15, LOT [id number], Allergan, 5 3 3 cc". Gross description only. No sections are submitted.  4.  The specimen is received in formalin labeled "right breast capsule" and consists of tan-white to yellow breast capsule measuring 9 x 6 x 2 cm and weighing 23 g.  The specimen is serially sectioned and representative sections are submitted.  (1 block)  05/09/2024 12:55:25 EDT  st   Disclaimer In addition to other data that may appear on the specimen containers, all labels have been inspected to confirm the presence of the patient's name and date of birth.  Specimen was received and underwent gross examination at [facility], [address] [place] [address].  Ordered by: RORY DANIELSON       Collected/Examined: [id number] 08:30AM       Verification Required       Stage: Final       Performed at: [facility]       Resulted: 10May2024 03:07PM       Last Updated: 10May2024 03:08PM       Accession: [id number]       Results Hx:	 Goal	[id number] Item Name		08:30AM	09:59AM [facility]		Report 1	Report 2  * indicates comments or annotations. Hover * or Report to view full result. Right click on result to view in new window.  Report #1 - [id number] 08:30AM - [facility] Accession Number : [id number] Patient:     ELLE JASSO   Accession:                             [id number]  Collected Date/Time:                   5/9/2024 08:30 EDT Received Date/Time:                    5/9/2024 11:48 EDT  Surgical Pathology Report - Auth (Verified)  Specimen(s) Submitted 1  Left breast implant Time obtained: 8:30 am 2  Left breast capsule Time obtained: 8:40 am 3  Right breast implant Time obtained: 9:00 am 4  Right breast capsule Time obtained: 9:10 am   Final Diagnosis 1.  Breast, left implant, removal and exchange: - Ruptured synthetic breast implant (macroscopic diagnosis).  2.  Breast, left tissue, capsulectomy: - Extensively calcified dense linear fibrous connective tissue containing embedded refractile non-polarizable foreign material and silicone granulomata; consistent with a synthetic breast implant soft tissue capsule.  3.  Breast, right implant, removal and exchange: - Ruptured synthetic breast implant (macroscopic diagnosis).  4.  Breast, right tissue, capsulectomy: - Extensively calcified dense linear fibrous connective tissue; consistent with a synthetic breast implant soft tissue capsule. - Atrophic fatty breast tissue without histopathologic abnormality. Verified by: Praneeth Barry M.D. (Electronic Signature)  Reported on: 05/10/24 15:07 EDT, [facility], [address] [place] [address] Phone: [phone]   Fax: [phone] _________________________________________________________________  Clinical Information Bilateral breast ruptured implant removed, capsulectomy, lateral capsulorrhaphy, implant exchange and mastopexy  Perioperative Diagnosis Bilateral breast implant and breast ptosis  Postoperative Diagnosis  Bilateral breast implant and breast ptosis  Gross Description 1.  The specimen is received without fixative, labeled "left breast implant" and consists of a soft round smooth ruptured implant, measuring 15 x 15 x 2.5 cm, and weighing 515 g. The rupture measures 3 cm and the implant is inscribed with "style 15, lot [id number], Allergan, 5 3 3 cc". Gross description only. No sections are submitted.  2.  The specimen is received in formalin labeled "left breast capsule" and consists of tan-white to yellow breast capsule measuring 10 x 8 x 4 cm and weighing 74 g.  The specimen is serially sectioned revealing decalcified surface and representative sections are submitted after short decalcification.  (1 block)  3.  The specimen is received without fixative, labeled "left breast implant" and consists of a soft round smooth ruptured implant, measuring 15 x 14.5 x 2.5 cm, and weighing 534  g.  The implant is inscribed with "style 15, LOT [id number], Allergan, 5 3 3 cc". Gross description only. No sections are submitted.  4.  The specimen is received in formalin labeled "right breast capsule" and consists of tan-white to yellow breast capsule measuring 9 x 6 x 2 cm and weighing 23 g.  The specimen is serially sectioned and representative sections are submitted.  (1 block)  05/09/2024 12:55:25 EDT  st   Disclaimer In addition to other data that may appear on the specimen containers, all labels have been inspected to confirm the presence of the patient's name and date of birth.  Specimen was received and underwent gross examination at [place] [address].  Report #2 - [id number] 09:59AM - [facility] Accession Number : [id number] Patient:   ELLE JASSO   Accession:                             [id number]  Collected Date/Time:                   6/13/2022 09:59 EDT Received Date/Time:                    6/13/2022 12:07 EDT  Surgical Pathology Report - Auth (Verified)  Specimen(s) Submitted Right knee shavings  Final Diagnosis Right knee shavings: - Fibrocartilage with degenerative change and synovium.  Verified by: Kathy Patel M.D. (Electronic Signature) Reported on: 06/15/22 13:04 EDT, [place] [address] Phone: [phone]   Fax: [phone] _________________________________________________________________  Clinical Information Right knee arthroscopy  COVID (-)  Perioperative Diagnosis Right meniscus tear  Gross Description The specimen is received in formalin labeled "right knee shavings".  The specimen consists of multiple pieces of tan and gray soft to firm tissue, measuring 0.5 x 0.5 x 0.2 cm in aggregate.  Representative sections are submitted.  (1 Block)  Specimen was received and underwent gross examination at [facility], [address] [place] [address].  06/13/2022 17:24:48 EDT  jl Details:	 Scheduled:	 Status:	Resulted: Requires Verification For:	 Recorded as History:	10May2024 03:08PM Overdue:	10Aug2024 12:00AM To Be Performed:	 Communicated By:	Recorded Priority:	Routine Ordered By:	RORY DANIELSON Supervised By:	RORY DANIELSON Managed By:	RORY DANIELSON Authorization:	Not Required Performing Instructions:	 Patient Instructions:	 Order Instructions:	 Questions:	          (none) Add'l Details:	 Financial Auth:	 Authorization #:	 Appt. Status:	Appointment Not Needed Effective:	10May2024 03:08PM Expires:	10May2024 03:08PM Done:	09May2024 08:30AM Order #:	[id number] Requisition #:	[id number] Label Type:	 Collection:	Collection Specimen Identifier:	 ID:	 CPT:	 LOINC:	 SNOMED:	 Type:	 Charges:	None Will Be Collected in Office?	No View link item history	 Goals:	None Charging:	 Override Encounter Details: Special Billing:	 Account #:	 Injury Date:	5/10/2024 3:08:11 PM Description:	 Encounters:	 Creation:	09May2024 Result Review RORY DANIELSON (Plastic Surgery)  Collection:	None Specified Be Done By:	None Specified Scheduled:	None Specified Performed:	None Specified Charge :	None Specified Annotations:	          (none)

## 2024-06-17 NOTE — ASSESSMENT
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 Doing well.  Pt happy with results.   - complete silicone based scar cream in 2 weeks - resume all physical activity and garments - c/w implant massage - pathology reviewed - all questions were answered - follow up in 6 weeks for incision check and DC  Photos were taken with patient permission.

## 2024-07-29 ENCOUNTER — APPOINTMENT (OUTPATIENT)
Dept: PLASTIC SURGERY | Facility: CLINIC | Age: 64
End: 2024-07-29

## 2024-11-18 ENCOUNTER — APPOINTMENT (OUTPATIENT)
Dept: OBGYN | Facility: CLINIC | Age: 64
End: 2024-11-18

## 2024-12-03 NOTE — ASU PATIENT PROFILE, ADULT - FALL HARM RISK - ATTEMPT OOB
No

## 2025-02-06 ENCOUNTER — APPOINTMENT (OUTPATIENT)
Dept: ORTHOPEDIC SURGERY | Facility: CLINIC | Age: 65
End: 2025-02-06
Payer: COMMERCIAL

## 2025-02-06 DIAGNOSIS — S83.241A OTHER TEAR OF MEDIAL MENISCUS, CURRENT INJURY, RIGHT KNEE, INITIAL ENCOUNTER: ICD-10-CM

## 2025-02-06 PROCEDURE — 99212 OFFICE O/P EST SF 10 MIN: CPT

## 2025-03-20 ENCOUNTER — APPOINTMENT (OUTPATIENT)
Dept: ORTHOPEDIC SURGERY | Facility: CLINIC | Age: 65
End: 2025-03-20

## 2025-03-20 DIAGNOSIS — S83.241A OTHER TEAR OF MEDIAL MENISCUS, CURRENT INJURY, RIGHT KNEE, INITIAL ENCOUNTER: ICD-10-CM

## 2025-03-20 PROCEDURE — 20611 DRAIN/INJ JOINT/BURSA W/US: CPT | Mod: RT

## 2025-03-20 PROCEDURE — 99213 OFFICE O/P EST LOW 20 MIN: CPT | Mod: 25

## 2025-03-21 ENCOUNTER — OUTPATIENT (OUTPATIENT)
Dept: OUTPATIENT SERVICES | Facility: HOSPITAL | Age: 65
LOS: 1 days | End: 2025-03-21
Payer: COMMERCIAL

## 2025-03-21 DIAGNOSIS — Z98.890 OTHER SPECIFIED POSTPROCEDURAL STATES: Chronic | ICD-10-CM

## 2025-03-21 DIAGNOSIS — Z98.82 BREAST IMPLANT STATUS: Chronic | ICD-10-CM

## 2025-03-21 DIAGNOSIS — K63.1 PERFORATION OF INTESTINE (NONTRAUMATIC): Chronic | ICD-10-CM

## 2025-03-21 DIAGNOSIS — Z00.8 ENCOUNTER FOR OTHER GENERAL EXAMINATION: ICD-10-CM

## 2025-03-21 PROCEDURE — 76536 US EXAM OF HEAD AND NECK: CPT | Mod: 26

## 2025-03-21 PROCEDURE — 76536 US EXAM OF HEAD AND NECK: CPT

## 2025-04-21 ENCOUNTER — APPOINTMENT (OUTPATIENT)
Dept: OBGYN | Facility: CLINIC | Age: 65
End: 2025-04-21
Payer: COMMERCIAL

## 2025-04-21 VITALS
DIASTOLIC BLOOD PRESSURE: 74 MMHG | SYSTOLIC BLOOD PRESSURE: 119 MMHG | HEIGHT: 63 IN | WEIGHT: 160 LBS | HEART RATE: 84 BPM | BODY MASS INDEX: 28.35 KG/M2

## 2025-04-21 DIAGNOSIS — Z01.419 ENCOUNTER FOR GYNECOLOGICAL EXAMINATION (GENERAL) (ROUTINE) W/OUT ABNORMAL FINDINGS: ICD-10-CM

## 2025-04-21 DIAGNOSIS — D25.9 LEIOMYOMA OF UTERUS, UNSPECIFIED: ICD-10-CM

## 2025-04-21 DIAGNOSIS — N85.2 HYPERTROPHY OF UTERUS: ICD-10-CM

## 2025-04-21 LAB
BILIRUB UR QL STRIP: NEGATIVE
CLARITY UR: CLEAR
COLLECTION METHOD: NORMAL
GLUCOSE UR-MCNC: NEGATIVE
HCG UR QL: 0.2 EU/DL
HGB UR QL STRIP.AUTO: NEGATIVE
KETONES UR-MCNC: NEGATIVE
LEUKOCYTE ESTERASE UR QL STRIP: NEGATIVE
NITRITE UR QL STRIP: NEGATIVE
PH UR STRIP: 5.5
PROT UR STRIP-MCNC: ABNORMAL
SP GR UR STRIP: >=1.03

## 2025-04-21 PROCEDURE — 99459 PELVIC EXAMINATION: CPT

## 2025-04-21 PROCEDURE — 76830 TRANSVAGINAL US NON-OB: CPT

## 2025-04-21 PROCEDURE — 99397 PER PM REEVAL EST PAT 65+ YR: CPT | Mod: 25

## 2025-04-21 PROCEDURE — 81003 URINALYSIS AUTO W/O SCOPE: CPT | Mod: QW

## 2025-04-21 RX ORDER — SEMAGLUTIDE 1.7 MG/.75ML
INJECTION, SOLUTION SUBCUTANEOUS
Refills: 0 | Status: ACTIVE | COMMUNITY

## 2025-04-30 LAB — CYTOLOGY CVX/VAG DOC THIN PREP: NORMAL

## 2025-05-03 ENCOUNTER — APPOINTMENT (OUTPATIENT)
Dept: ORTHOPEDIC SURGERY | Facility: CLINIC | Age: 65
End: 2025-05-03
Payer: COMMERCIAL

## 2025-05-03 VITALS — BODY MASS INDEX: 28.35 KG/M2 | HEIGHT: 63 IN | WEIGHT: 160 LBS

## 2025-05-03 DIAGNOSIS — M75.32 CALCIFIC TENDINITIS OF LEFT SHOULDER: ICD-10-CM

## 2025-05-03 PROCEDURE — 73030 X-RAY EXAM OF SHOULDER: CPT | Mod: LT

## 2025-05-03 PROCEDURE — 99213 OFFICE O/P EST LOW 20 MIN: CPT

## 2025-05-03 RX ORDER — IBUPROFEN 800 MG/1
800 TABLET, FILM COATED ORAL
Qty: 90 | Refills: 0 | Status: ACTIVE | COMMUNITY
Start: 2025-05-03 | End: 1900-01-01

## 2025-05-22 ENCOUNTER — APPOINTMENT (OUTPATIENT)
Dept: ORTHOPEDIC SURGERY | Facility: CLINIC | Age: 65
End: 2025-05-22

## 2025-05-22 DIAGNOSIS — M75.32 CALCIFIC TENDINITIS OF LEFT SHOULDER: ICD-10-CM

## 2025-05-22 PROCEDURE — 99214 OFFICE O/P EST MOD 30 MIN: CPT

## 2025-05-22 RX ORDER — DICLOFENAC SODIUM 75 MG/1
75 TABLET, DELAYED RELEASE ORAL
Qty: 60 | Refills: 1 | Status: ACTIVE | COMMUNITY
Start: 2025-05-22 | End: 1900-01-01

## 2025-07-10 ENCOUNTER — APPOINTMENT (OUTPATIENT)
Dept: ORTHOPEDIC SURGERY | Facility: CLINIC | Age: 65
End: 2025-07-10

## 2025-09-09 ENCOUNTER — APPOINTMENT (OUTPATIENT)
Dept: ORTHOPEDIC SURGERY | Facility: CLINIC | Age: 65
End: 2025-09-09